# Patient Record
Sex: MALE | Race: WHITE
[De-identification: names, ages, dates, MRNs, and addresses within clinical notes are randomized per-mention and may not be internally consistent; named-entity substitution may affect disease eponyms.]

---

## 2021-01-06 ENCOUNTER — HOSPITAL ENCOUNTER (OUTPATIENT)
Age: 76
End: 2021-01-06
Payer: MEDICARE

## 2021-01-06 DIAGNOSIS — J44.9: Primary | ICD-10-CM

## 2021-01-06 DIAGNOSIS — R05: ICD-10-CM

## 2021-01-06 PROCEDURE — 87077 CULTURE AEROBIC IDENTIFY: CPT

## 2021-01-06 PROCEDURE — 87205 SMEAR GRAM STAIN: CPT

## 2021-01-06 PROCEDURE — 87070 CULTURE OTHR SPECIMN AEROBIC: CPT

## 2021-01-06 PROCEDURE — 87186 SC STD MICRODIL/AGAR DIL: CPT

## 2021-07-15 ENCOUNTER — HOSPITAL ENCOUNTER (OUTPATIENT)
Age: 76
End: 2021-07-15
Payer: MEDICARE

## 2021-07-15 DIAGNOSIS — Z87.891: Primary | ICD-10-CM

## 2021-07-15 PROCEDURE — 71271 CT THORAX LUNG CANCER SCR C-: CPT

## 2021-07-30 ENCOUNTER — HOSPITAL ENCOUNTER (OUTPATIENT)
Dept: HOSPITAL 100 - LABSPEC | Age: 76
Discharge: HOME | End: 2021-07-30
Payer: MEDICARE

## 2021-07-30 DIAGNOSIS — R05: ICD-10-CM

## 2021-07-30 DIAGNOSIS — J47.9: Primary | ICD-10-CM

## 2021-07-30 PROCEDURE — 87070 CULTURE OTHR SPECIMN AEROBIC: CPT

## 2021-07-30 PROCEDURE — 87077 CULTURE AEROBIC IDENTIFY: CPT

## 2021-07-30 PROCEDURE — 87205 SMEAR GRAM STAIN: CPT

## 2022-07-16 ENCOUNTER — HOSPITAL ENCOUNTER (OUTPATIENT)
Dept: HOSPITAL 100 - CT | Age: 77
Discharge: HOME | End: 2022-07-16
Payer: MEDICARE

## 2022-07-16 DIAGNOSIS — Z87.891: Primary | ICD-10-CM

## 2022-07-16 PROCEDURE — 71271 CT THORAX LUNG CANCER SCR C-: CPT

## 2023-04-02 ENCOUNTER — HOSPITAL ENCOUNTER (OUTPATIENT)
Dept: HOSPITAL 100 - LABSPEC | Age: 78
Discharge: HOME | End: 2023-04-02
Payer: MEDICARE

## 2023-04-02 DIAGNOSIS — R05.9: Primary | ICD-10-CM

## 2023-04-02 LAB
CYTO FLD: (no result)
CYTOLOGY, BODY FLUID / CSF: (no result)

## 2023-04-02 PROCEDURE — 88312 SPECIAL STAINS GROUP 1: CPT

## 2023-04-02 PROCEDURE — 88108 CYTOPATH CONCENTRATE TECH: CPT

## 2023-04-02 PROCEDURE — 88313 SPECIAL STAINS GROUP 2: CPT

## 2023-04-13 PROBLEM — M41.9 KYPHOSCOLIOSIS: Status: ACTIVE | Noted: 2023-04-13

## 2023-04-13 PROBLEM — R35.0 URINARY FREQUENCY: Status: ACTIVE | Noted: 2023-04-13

## 2023-04-13 PROBLEM — M48.062 NEUROGENIC CLAUDICATION DUE TO LUMBAR SPINAL STENOSIS: Status: ACTIVE | Noted: 2023-04-13

## 2023-04-13 PROBLEM — R04.2 HEMOPTYSIS: Status: ACTIVE | Noted: 2023-04-13

## 2023-04-13 PROBLEM — M89.9 LESION OF BONE OF KNEE: Status: ACTIVE | Noted: 2023-04-13

## 2023-04-13 PROBLEM — N39.0 ACUTE LOWER UTI: Status: RESOLVED | Noted: 2023-04-13 | Resolved: 2023-04-13

## 2023-04-13 PROBLEM — M47.817 LUMBOSACRAL SPONDYLOSIS: Status: ACTIVE | Noted: 2023-04-13

## 2023-04-13 PROBLEM — R35.1 BENIGN PROSTATIC HYPERPLASIA WITH NOCTURIA: Status: ACTIVE | Noted: 2023-04-13

## 2023-04-13 PROBLEM — R91.1 PULMONARY NODULE: Status: ACTIVE | Noted: 2023-04-13

## 2023-04-13 PROBLEM — R35.1 NOCTURIA: Status: ACTIVE | Noted: 2023-04-13

## 2023-04-13 PROBLEM — M19.042 ARTHRITIS OF LEFT HAND: Status: ACTIVE | Noted: 2023-04-13

## 2023-04-13 PROBLEM — M54.17 LUMBOSACRAL RADICULOPATHY: Status: ACTIVE | Noted: 2023-04-13

## 2023-04-13 PROBLEM — R53.83 FATIGUE: Status: ACTIVE | Noted: 2023-04-13

## 2023-04-13 PROBLEM — R63.4 WEIGHT LOSS: Status: ACTIVE | Noted: 2023-04-13

## 2023-04-13 PROBLEM — G89.4 CHRONIC PAIN DISORDER: Status: ACTIVE | Noted: 2023-04-13

## 2023-04-13 PROBLEM — J40 BRONCHITIS: Status: ACTIVE | Noted: 2023-04-13

## 2023-04-13 PROBLEM — M54.12 CERVICAL RADICULITIS: Status: ACTIVE | Noted: 2023-04-13

## 2023-04-13 PROBLEM — M62.81 MUSCLE WEAKNESS OF LEFT UPPER EXTREMITY: Status: ACTIVE | Noted: 2023-04-13

## 2023-04-13 PROBLEM — G25.2 INTENTION TREMOR: Status: ACTIVE | Noted: 2023-04-13

## 2023-04-13 PROBLEM — R29.898 LEFT ARM WEAKNESS: Status: ACTIVE | Noted: 2023-04-13

## 2023-04-13 PROBLEM — N40.1 BENIGN PROSTATIC HYPERPLASIA WITH NOCTURIA: Status: ACTIVE | Noted: 2023-04-13

## 2023-04-13 PROBLEM — J44.9 COPD (CHRONIC OBSTRUCTIVE PULMONARY DISEASE) (MULTI): Status: ACTIVE | Noted: 2023-04-13

## 2023-04-13 PROBLEM — M48.02 CERVICAL SPINAL STENOSIS: Status: ACTIVE | Noted: 2023-04-13

## 2023-04-13 RX ORDER — LATANOPROST 50 UG/ML
1 SOLUTION/ DROPS OPHTHALMIC DAILY
COMMUNITY
Start: 2021-05-28

## 2023-04-13 RX ORDER — TRAMADOL HYDROCHLORIDE 50 MG/1
50 TABLET ORAL 3 TIMES DAILY PRN
COMMUNITY
Start: 2022-11-30 | End: 2023-10-24 | Stop reason: SDUPTHER

## 2023-04-13 RX ORDER — GLUC/MSM/COLGN2/HYAL/ANTIARTH3 375-375-20
1 TABLET ORAL DAILY
COMMUNITY

## 2023-04-13 RX ORDER — FINASTERIDE 5 MG/1
1 TABLET, FILM COATED ORAL DAILY
COMMUNITY

## 2023-04-13 RX ORDER — TAMSULOSIN HYDROCHLORIDE 0.4 MG/1
0.4 CAPSULE ORAL
COMMUNITY
End: 2024-02-12 | Stop reason: SDUPTHER

## 2023-04-13 RX ORDER — PROPRANOLOL HYDROCHLORIDE 20 MG/1
1 TABLET ORAL 2 TIMES DAILY
COMMUNITY

## 2023-04-13 RX ORDER — NITROFURANTOIN 25; 75 MG/1; MG/1
1 CAPSULE ORAL 2 TIMES DAILY
COMMUNITY
Start: 2021-05-11

## 2023-04-13 RX ORDER — ATORVASTATIN CALCIUM 40 MG/1
40 TABLET, FILM COATED ORAL DAILY
COMMUNITY

## 2023-04-14 ENCOUNTER — OFFICE VISIT (OUTPATIENT)
Dept: PRIMARY CARE | Facility: CLINIC | Age: 78
End: 2023-04-14
Payer: MEDICARE

## 2023-04-14 VITALS
HEART RATE: 110 BPM | DIASTOLIC BLOOD PRESSURE: 64 MMHG | BODY MASS INDEX: 19.34 KG/M2 | OXYGEN SATURATION: 92 % | WEIGHT: 116.1 LBS | SYSTOLIC BLOOD PRESSURE: 118 MMHG | HEIGHT: 65 IN

## 2023-04-14 DIAGNOSIS — R91.1 PULMONARY NODULE: ICD-10-CM

## 2023-04-14 DIAGNOSIS — R04.2 HEMOPTYSIS: Primary | ICD-10-CM

## 2023-04-14 DIAGNOSIS — J41.8 MIXED SIMPLE AND MUCOPURULENT CHRONIC BRONCHITIS (MULTI): ICD-10-CM

## 2023-04-14 PROCEDURE — 1157F ADVNC CARE PLAN IN RCRD: CPT | Performed by: FAMILY MEDICINE

## 2023-04-14 PROCEDURE — 99214 OFFICE O/P EST MOD 30 MIN: CPT | Performed by: FAMILY MEDICINE

## 2023-04-14 PROCEDURE — 1160F RVW MEDS BY RX/DR IN RCRD: CPT | Performed by: FAMILY MEDICINE

## 2023-04-14 PROCEDURE — 1036F TOBACCO NON-USER: CPT | Performed by: FAMILY MEDICINE

## 2023-04-14 PROCEDURE — 1159F MED LIST DOCD IN RCRD: CPT | Performed by: FAMILY MEDICINE

## 2023-04-14 ASSESSMENT — ENCOUNTER SYMPTOMS
FEVER: 0
COUGH: 1
CHILLS: 0
PALPITATIONS: 0
NAUSEA: 0
WHEEZING: 1
FATIGUE: 1
SHORTNESS OF BREATH: 1

## 2023-04-14 ASSESSMENT — PATIENT HEALTH QUESTIONNAIRE - PHQ9
1. LITTLE INTEREST OR PLEASURE IN DOING THINGS: NOT AT ALL
2. FEELING DOWN, DEPRESSED OR HOPELESS: NOT AT ALL
SUM OF ALL RESPONSES TO PHQ9 QUESTIONS 1 AND 2: 0

## 2023-04-14 NOTE — PROGRESS NOTES
"Subjective   Patient ID: Olayinka Melara is a 77 y.o. male who presents for Follow-up (ER ).    HPI   He was in his usual state of health without any additional shortness of breath URI symptoms or allergy symptoms, but he was coughing and coughed up some red blood clots.  Went to the ER and the basic blood testing was stable, CT showed bronchitis but also a new nodule that was 1 cm x 0.5 cm.  This was new compared to the CT from 2021.  He did not feel bad before but he does feel little bit better with the antibiotic and the prednisone.  He has an office visit with pulmonology next week.  And I talked to him about the importance of keeping that up.  The likely diagnosis is bronchitis caused the clot, but obviously we have to follow the new lung nodule.  He will call sooner if he has more breathing issues or coughs up any more blood.  If he coughs up a lot of blood and feels sicker he will go back to the ER    Review of Systems   Constitutional:  Positive for fatigue. Negative for chills and fever.   Respiratory:  Positive for cough, shortness of breath and wheezing.    Cardiovascular:  Negative for chest pain and palpitations.   Gastrointestinal:  Negative for nausea.       Objective   /64   Pulse 110   Ht 1.651 m (5' 5\")   Wt 52.7 kg (116 lb 1.6 oz)   SpO2 92%   BMI 19.32 kg/m²     Physical Exam  Constitutional:       Appearance: Normal appearance.   HENT:      Head: Normocephalic and atraumatic.   Cardiovascular:      Rate and Rhythm: Normal rate and regular rhythm.      Heart sounds: Normal heart sounds.   Pulmonary:      Effort: Pulmonary effort is normal. No respiratory distress.      Breath sounds: No stridor. Wheezing and rhonchi present. No rales.   Chest:      Chest wall: No tenderness.   Skin:     General: Skin is warm and dry.   Neurological:      General: No focal deficit present.      Mental Status: He is alert and oriented to person, place, and time.      Gait: Gait normal.   Psychiatric:    "      Mood and Affect: Mood normal.         Behavior: Behavior normal.         Thought Content: Thought content normal.         Judgment: Judgment normal.         Assessment/Plan   Problem List Items Addressed This Visit          Respiratory    COPD (chronic obstructive pulmonary disease) (CMS/HCC)    Hemoptysis - Primary    Pulmonary nodule

## 2023-08-25 ENCOUNTER — HOSPITAL ENCOUNTER (OUTPATIENT)
Dept: DATA CONVERSION | Facility: HOSPITAL | Age: 78
End: 2023-08-25
Attending: PAIN MEDICINE | Admitting: PAIN MEDICINE
Payer: MEDICARE

## 2023-08-25 DIAGNOSIS — M48.07 SPINAL STENOSIS, LUMBOSACRAL REGION: ICD-10-CM

## 2023-08-25 DIAGNOSIS — R52 PAIN, UNSPECIFIED: ICD-10-CM

## 2023-08-25 DIAGNOSIS — M54.17 RADICULOPATHY, LUMBOSACRAL REGION: ICD-10-CM

## 2023-09-01 ENCOUNTER — HOSPITAL ENCOUNTER (OUTPATIENT)
Age: 78
Discharge: HOME | End: 2023-09-01
Payer: MEDICARE

## 2023-09-01 DIAGNOSIS — D50.9: ICD-10-CM

## 2023-09-01 DIAGNOSIS — R06.02: ICD-10-CM

## 2023-09-01 DIAGNOSIS — R05.9: Primary | ICD-10-CM

## 2023-09-01 LAB
DEPRECATED RDW RBC: 55.6 FL (ref 35.1–43.9)
ERYTHROCYTE [DISTWIDTH] IN BLOOD: 17.9 % (ref 11.6–14.6)
HCT VFR BLD AUTO: 33.7 % (ref 40–54)
HEMOGLOBIN: 10.5 G/DL (ref 13–16.5)
HGB BLD-MCNC: 10.5 G/DL (ref 13–16.5)
MCV RBC: 86.4 FL (ref 80–94)
MEAN CORP HGB CONC: 31.2 G/DL (ref 32–36)
MEAN PLATELET VOL.: 9.9 FL (ref 6.2–12)
PLATELET # BLD: 474 K/MM3 (ref 150–450)
PLATELET COUNT: 474 K/MM3 (ref 150–450)
RBC # BLD AUTO: 3.9 M/MM3 (ref 4.6–6.2)
RBC DISTRIBUTION WIDTH CV: 17.9 % (ref 11.6–14.6)
RBC DISTRIBUTION WIDTH SD: 55.6 FL (ref 35.1–43.9)
WBC # BLD AUTO: 7.2 K/MM3 (ref 4.4–11)
WHITE BLOOD COUNT: 7.2 K/MM3 (ref 4.4–11)

## 2023-09-01 PROCEDURE — 87186 SC STD MICRODIL/AGAR DIL: CPT

## 2023-09-01 PROCEDURE — 87070 CULTURE OTHR SPECIMN AEROBIC: CPT

## 2023-09-01 PROCEDURE — 87077 CULTURE AEROBIC IDENTIFY: CPT

## 2023-09-01 PROCEDURE — 87205 SMEAR GRAM STAIN: CPT

## 2023-09-01 PROCEDURE — 36415 COLL VENOUS BLD VENIPUNCTURE: CPT

## 2023-09-01 PROCEDURE — 85027 COMPLETE CBC AUTOMATED: CPT

## 2023-10-01 NOTE — OP NOTE
PROCEDURE DETAILS    Preoperative Diagnosis:  Lumbosacral radiculopathy, M54.17    Postoperative Diagnosis:  Lumbosacral radiculopathy, M54.17    Surgeon: Buck Baer  Resident/Fellow/Other Assistant: None of these were associated with this case    Procedure:  1. R L4 + L5 TFESI    Anesthesia: No anesthesiologist associated with this case  Estimated Blood Loss: 0  Findings: NA  Additional Details: The patient has a greater than 2-month history of severe low back and leg pain.  The patient has previously had 6 weeks of conservative management with exercise therapy  and medications.  The patient is compliant with home exercises for this issue.  The pain significantly interrupts the patient's physical function.  The patient does not desire spine surgery and his medical comorbidities make him an unacceptable candidate  for surgery.  The patient had undergone a previous epidural injection in December 2022 and obtained greater 60% pain relief and functional improvement for over 3 months.  He cannot sleep for 4 hours or lift or carry anything at all due to the pain.  His  imaging is notable for right-sided neuroforaminal stenosis at L4-L5 and L5-S1 which correlates with his symptoms of a right L4 and L5 radiculopathy so the procedure was performed at those 2 levels.        Operative Report:   Procedure: Right lumbar transforaminal epidural steroid injection under fluoroscopic guidance of the right L4 nerve root at the right L4-L5 foramen and the  right L5 nerve root at the right L5-S1 foramen  Diagnosis: Lumbosacral radiculopathy  Solution used: 1 ml of Kenalog 40mg, 3 ml normal saline, 1 ml lidocaine 2%, 5ml total. 2.5 mL per site  Anesthesia: Local  Complications: None    After informed consent was obtained, the patient was brought to the OR and placed in the prone position. The area in question was prepped and draped  in sterile fashion. An ipsilateral oblique fluoroscopic view of the lumbar spine was  obtained and after 3ml of lidocaine 1% was injected into the skin at each site, a 22-gauge Chiba needle was inserted into the skin and advanced to the 6 clock position  beneath the right L4 and L5 pedicles under intermittent fluoroscopic guidance.  Needle placement was challenging due to the patient's anatomy.  Proper needle position was confirmed via both AP and lateral fluoroscopy.  1 mL Omnipaque was injected under  live fluoroscopy at each site which demonstrated appropriate epidural and nerve root uptake and the absence of any intravascular or intrathecal spread. The local anesthetic steroid solution was then injected incrementally at each site. The 2 needles were  removed. Bleeding was nil. The patient tolerated the procedure well and was transferred to the recovery room in good condition.                        Attestation:   Note Completion:  Attending Attestation I performed the procedure without a resident         Electronic Signatures:  Buck Baer)  (Signed 25-Aug-2023 17:08)   Authored: Post-Operative Note, Chart Review, Note Completion      Last Updated: 25-Aug-2023 17:08 by Buck Baer)

## 2023-10-02 LAB
6-ACETYLMORPHINE: <25 NG/ML
7-AMINOCLONAZEPAM: <25 NG/ML
ALPHA-HYDROXYALPRAZOLAM: <25 NG/ML
ALPHA-HYDROXYMIDAZOLAM: <25 NG/ML
ALPRAZOLAM: <25 NG/ML
AMPHETAMINE (PRESENCE) IN URINE BY SCREEN METHOD: ABNORMAL
BARBITURATES PRESENCE IN URINE BY SCREEN METHOD: ABNORMAL
CANNABINOIDS IN URINE BY SCREEN METHOD: ABNORMAL
CHLORDIAZEPOXIDE: <25 NG/ML
CLONAZEPAM: <25 NG/ML
COCAINE (PRESENCE) IN URINE BY SCREEN METHOD: ABNORMAL
CODEINE: <50 NG/ML
CREATINE, URINE FOR DRUG: 71.5 MG/DL
DIAZEPAM: <25 NG/ML
DRUG SCREEN COMMENT URINE: ABNORMAL
EDDP: <25 NG/ML
FENTANYL CONFIRMATION, URINE: <2.5 NG/ML
HYDROCODONE: <25 NG/ML
HYDROMORPHONE: <25 NG/ML
LORAZEPAM: <25 NG/ML
METHADONE CONFIRMATION,URINE: <25 NG/ML
MIDAZOLAM: <25 NG/ML
MORPHINE URINE: <50 NG/ML
NORDIAZEPAM: <25 NG/ML
NORFENTANYL: <2.5 NG/ML
NORHYDROCODONE: <25 NG/ML
NOROXYCODONE: <25 NG/ML
O-DESMETHYLTRAMADOL: >1000 NG/ML
OXAZEPAM: <25 NG/ML
OXYCODONE: <25 NG/ML
OXYMORPHONE: <25 NG/ML
PHENCYCLIDINE (PRESENCE) IN URINE BY SCREEN METHOD: ABNORMAL
TEMAZEPAM: <25 NG/ML
TRAMADOL: >1000 NG/ML
ZOLPIDEM METABOLITE (ZCA): <25 NG/ML
ZOLPIDEM: <25 NG/ML

## 2023-10-06 ENCOUNTER — LAB (OUTPATIENT)
Dept: LAB | Facility: LAB | Age: 78
End: 2023-10-06
Payer: MEDICARE

## 2023-10-06 DIAGNOSIS — D50.9 IRON DEFICIENCY ANEMIA, UNSPECIFIED: ICD-10-CM

## 2023-10-06 DIAGNOSIS — D50.9 IRON DEFICIENCY ANEMIA, UNSPECIFIED: Primary | ICD-10-CM

## 2023-10-06 LAB
ALBUMIN SERPL BCP-MCNC: 4 G/DL (ref 3.4–5)
ALP SERPL-CCNC: 65 U/L (ref 33–136)
ALT SERPL W P-5'-P-CCNC: 5 U/L (ref 10–52)
ANION GAP SERPL CALC-SCNC: 12 MMOL/L (ref 10–20)
AST SERPL W P-5'-P-CCNC: 9 U/L (ref 9–39)
BASOPHILS # BLD AUTO: 0.05 X10*3/UL (ref 0–0.1)
BASOPHILS NFR BLD AUTO: 0.8 %
BILIRUB SERPL-MCNC: 0.9 MG/DL (ref 0–1.2)
BUN SERPL-MCNC: 14 MG/DL (ref 6–23)
CALCIUM SERPL-MCNC: 9 MG/DL (ref 8.6–10.3)
CHLORIDE SERPL-SCNC: 99 MMOL/L (ref 98–107)
CO2 SERPL-SCNC: 30 MMOL/L (ref 21–32)
CREAT SERPL-MCNC: 0.62 MG/DL (ref 0.5–1.3)
EOSINOPHIL # BLD AUTO: 0.09 X10*3/UL (ref 0–0.4)
EOSINOPHIL NFR BLD AUTO: 1.4 %
ERYTHROCYTE [DISTWIDTH] IN BLOOD BY AUTOMATED COUNT: 19.7 % (ref 11.5–14.5)
FERRITIN SERPL-MCNC: 29 NG/ML (ref 20–300)
FOLATE SERPL-MCNC: 7.8 NG/ML
GFR SERPL CREATININE-BSD FRML MDRD: >90 ML/MIN/1.73M*2
GLUCOSE SERPL-MCNC: 83 MG/DL (ref 74–99)
HCT VFR BLD AUTO: 38.6 % (ref 41–52)
HGB BLD-MCNC: 11 G/DL (ref 13.5–17.5)
IMM GRANULOCYTES # BLD AUTO: 0.09 X10*3/UL (ref 0–0.5)
IMM GRANULOCYTES NFR BLD AUTO: 1.4 % (ref 0–0.9)
IRON SATN MFR SERPL: 8 % (ref 25–45)
IRON SERPL-MCNC: 29 UG/DL (ref 35–150)
LYMPHOCYTES # BLD AUTO: 1.65 X10*3/UL (ref 0.8–3)
LYMPHOCYTES NFR BLD AUTO: 25.6 %
MCH RBC QN AUTO: 25.2 PG (ref 26–34)
MCHC RBC AUTO-ENTMCNC: 28.5 G/DL (ref 32–36)
MCV RBC AUTO: 89 FL (ref 80–100)
MONOCYTES # BLD AUTO: 0.51 X10*3/UL (ref 0.05–0.8)
MONOCYTES NFR BLD AUTO: 7.9 %
NEUTROPHILS # BLD AUTO: 4.06 X10*3/UL (ref 1.6–5.5)
NEUTROPHILS NFR BLD AUTO: 62.9 %
NRBC BLD-RTO: 0 /100 WBCS (ref 0–0)
PLATELET # BLD AUTO: 411 X10*3/UL (ref 150–450)
PMV BLD AUTO: 9.9 FL (ref 7.5–11.5)
POTASSIUM SERPL-SCNC: 4.5 MMOL/L (ref 3.5–5.3)
PROT SERPL-MCNC: 6.1 G/DL (ref 6.4–8.2)
RBC # BLD AUTO: 4.36 X10*6/UL (ref 4.5–5.9)
SODIUM SERPL-SCNC: 136 MMOL/L (ref 136–145)
TIBC SERPL-MCNC: 357 UG/DL (ref 240–445)
UIBC SERPL-MCNC: 328 UG/DL (ref 110–370)
VIT B12 SERPL-MCNC: 721 PG/ML (ref 211–911)
WBC # BLD AUTO: 6.5 X10*3/UL (ref 4.4–11.3)

## 2023-10-06 PROCEDURE — 83550 IRON BINDING TEST: CPT

## 2023-10-06 PROCEDURE — 82607 VITAMIN B-12: CPT

## 2023-10-06 PROCEDURE — 82746 ASSAY OF FOLIC ACID SERUM: CPT

## 2023-10-06 PROCEDURE — 85025 COMPLETE CBC W/AUTO DIFF WBC: CPT

## 2023-10-06 PROCEDURE — 82728 ASSAY OF FERRITIN: CPT

## 2023-10-06 PROCEDURE — 36415 COLL VENOUS BLD VENIPUNCTURE: CPT

## 2023-10-06 PROCEDURE — 80053 COMPREHEN METABOLIC PANEL: CPT

## 2023-10-06 PROCEDURE — 83540 ASSAY OF IRON: CPT

## 2023-10-11 ENCOUNTER — HOSPITAL ENCOUNTER (OUTPATIENT)
Dept: HOSPITAL 100 - CT | Age: 78
Discharge: HOME | End: 2023-10-11
Payer: MEDICARE

## 2023-10-11 DIAGNOSIS — R91.1: Primary | ICD-10-CM

## 2023-10-11 PROCEDURE — 71250 CT THORAX DX C-: CPT

## 2023-10-12 ENCOUNTER — OFFICE VISIT (OUTPATIENT)
Dept: HEMATOLOGY/ONCOLOGY | Facility: CLINIC | Age: 78
End: 2023-10-12
Payer: MEDICARE

## 2023-10-12 VITALS
TEMPERATURE: 97.9 F | SYSTOLIC BLOOD PRESSURE: 94 MMHG | BODY MASS INDEX: 18.67 KG/M2 | OXYGEN SATURATION: 97 % | HEART RATE: 81 BPM | WEIGHT: 116.18 LBS | RESPIRATION RATE: 18 BRPM | HEIGHT: 66 IN | DIASTOLIC BLOOD PRESSURE: 64 MMHG

## 2023-10-12 DIAGNOSIS — E61.1 IRON DEFICIENCY: ICD-10-CM

## 2023-10-12 DIAGNOSIS — D63.8 ANEMIA OF CHRONIC DISEASE: Primary | ICD-10-CM

## 2023-10-12 PROCEDURE — 1159F MED LIST DOCD IN RCRD: CPT

## 2023-10-12 PROCEDURE — 1036F TOBACCO NON-USER: CPT

## 2023-10-12 PROCEDURE — 99214 OFFICE O/P EST MOD 30 MIN: CPT

## 2023-10-12 PROCEDURE — 1126F AMNT PAIN NOTED NONE PRSNT: CPT

## 2023-10-12 PROCEDURE — 1160F RVW MEDS BY RX/DR IN RCRD: CPT

## 2023-10-12 RX ORDER — METHYLPREDNISOLONE SODIUM SUCCINATE 40 MG/ML
40 INJECTION INTRAMUSCULAR; INTRAVENOUS AS NEEDED
Status: CANCELLED | OUTPATIENT
Start: 2023-10-16

## 2023-10-12 RX ORDER — ALBUTEROL SULFATE 0.83 MG/ML
3 SOLUTION RESPIRATORY (INHALATION) AS NEEDED
Status: CANCELLED | OUTPATIENT
Start: 2023-10-16

## 2023-10-12 RX ORDER — HEPARIN 100 UNIT/ML
500 SYRINGE INTRAVENOUS AS NEEDED
Status: CANCELLED | OUTPATIENT
Start: 2023-10-16

## 2023-10-12 RX ORDER — FAMOTIDINE 10 MG/ML
20 INJECTION INTRAVENOUS ONCE AS NEEDED
Status: CANCELLED | OUTPATIENT
Start: 2023-10-16

## 2023-10-12 RX ORDER — EPINEPHRINE 0.3 MG/.3ML
0.3 INJECTION SUBCUTANEOUS EVERY 5 MIN PRN
Status: CANCELLED | OUTPATIENT
Start: 2023-10-16

## 2023-10-12 RX ORDER — DIPHENHYDRAMINE HYDROCHLORIDE 50 MG/ML
50 INJECTION INTRAMUSCULAR; INTRAVENOUS AS NEEDED
Status: CANCELLED | OUTPATIENT
Start: 2023-10-16

## 2023-10-12 RX ORDER — HEPARIN SODIUM,PORCINE/PF 10 UNIT/ML
50 SYRINGE (ML) INTRAVENOUS AS NEEDED
Status: CANCELLED | OUTPATIENT
Start: 2023-10-16

## 2023-10-12 ASSESSMENT — PAIN SCALES - GENERAL: PAINLEVEL: 0-NO PAIN

## 2023-10-12 NOTE — PROGRESS NOTES
Patient ID: Mony Melara is a 77 y.o. male.    Subjective    HPI    History of Present Illness:     ID Statement:    MONY MELARA is a 77 year old Male      Chief Complaint: Evaluation for anemia   Interval History:    PCP : Dr. Aaron    Reason for referral: Anemia    HPI  75 year old man with pmh of BPH, COPD, kyphoscoliosis, Lumbosacral spondylosis, referred for anemia. He has chronic anemia and progressively dropping Hg 11-12 g/dl last year to 8.6 g/dl in Sep 2021 until recently he had a visit to ER on 10/22/21 for SoB where his Hg was 7.0 g/dl. MCV is 64, his ferritin was 10 , b12 and ferritin were normal last year. He was transfused 1 unit PRBC and told to follow hematology  He has been feeling more tired recently , he has chronic fatigue that worsened near the time he went to the emergency room   He has chronic SOB and DUARTE from COPD and actually was having some hemoptysis with blood tinged sputum that day . This has improved   He has occasionally seeing melena around once per month   He has chronic  epigastric pain and occasional nausea   He is not active and does not have energy to do much activities   Weight fluctuating but mostly same range of 112-118   He is no urinary complaints   No changes in hair or nails     EGD on 1/21/22 showed erosive gastropathy with stigmata of bleeding, biopsies showed healing ulcers , no malignancy     interval history - 10/12/23    Energy is decent  Eating and drinking okay   Bruises easy  No bleeding issues    DUARTE stairs remains on 2L O2  Occasional coughing- yellow/clear  No chest pain or pressure   No fever, chills, or night sweats    No abdominal pain or cramping   No diarrhea or constipation   Frequent urination, no dysuria or hematuria    No numbness or tingling in hands or feet  No new concerns      Objective    BSA: 1.56 meters squared  BP 94/64 (BP Location: Right arm, Patient Position: Sitting, BP Cuff Size: Adult)   Pulse 81   Temp 36.6 °C (97.9 °F)  "(Temporal)   Resp 18   Ht 1.664 m (5' 5.5\")   Wt 52.7 kg (116 lb 2.9 oz)   SpO2 97%   BMI 19.04 kg/m²      Physical Exam  Vitals and nursing note reviewed.   Constitutional:       Appearance: Normal appearance.   HENT:      Head: Normocephalic and atraumatic.      Mouth/Throat:      Mouth: Mucous membranes are moist.      Pharynx: Oropharynx is clear.   Eyes:      Extraocular Movements: Extraocular movements intact.      Pupils: Pupils are equal, round, and reactive to light.   Cardiovascular:      Pulses: Normal pulses.      Heart sounds: Normal heart sounds. No murmur heard.     No gallop.   Pulmonary:      Effort: No respiratory distress.      Breath sounds: Wheezing present.      Comments: Home oxygen 2L NC    Abdominal:      General: Bowel sounds are normal. There is no distension.      Palpations: There is no mass.      Tenderness: There is no abdominal tenderness.   Musculoskeletal:         General: No swelling or deformity. Normal range of motion.      Cervical back: Normal range of motion and neck supple.   Skin:     General: Skin is warm and dry.      Findings: No erythema or lesion.   Neurological:      General: No focal deficit present.      Mental Status: He is alert and oriented to person, place, and time.      Motor: No weakness.   Psychiatric:         Mood and Affect: Mood normal.         Behavior: Behavior normal.         Performance Status:  Asymptomatic      Assessment/Plan      Assessment:    1. Anemia   secondary to iron deficiency on a likely AOCD  s/p one unit PRBC transfusion in ER in Oct 2021     have received 2 course of IV iron   improved Hg now to 11.4 g/dl and sufficient iron stores     remaining labs normal/ negative     EGD showed evidence of upper GI bleed with erosive gastropathy/ ulcer     11/29/22- His energy remains low and he feels weak. His breathing is stable, remains on 2L oxygen. Denies any abnormal bleeding or bruising issues. Is taking PO iron daily, and tolerating " "well.     He is slightly more anemic and iron deficient. Hgb (10.7), Iron Serum (70), Ferritin (34), TIBC (319), Tsat (22%), Vitamin B12 (527). Discussed with patient and his wife doing two doses of Venofer and will repeat labs in 8 weeks after second dose of Venofer.    10/12/23:  Energy continues to be low. Breathing is stable, home oxygen 2L nasal canula. No major bleeding events, patient does bruise easy.  No constitutional \"B\" symptoms reported.      He remains mildly anemic with improvement Hgb of 11.0g/dl.  Iron parameters are once again deficient. Fe 29, T sat 8%, Ferritin 29, B12 721.  Discussed ordering IV iron for iron replacement. Will continue to monitor labs closely.          Plan:  Ordered IV course of Iron. Sent to RN for precert.    2. Labs prior to follow-up (CBC w/diff, CMP, Ferritin, Iron studies, B12)  3. Follow-up in 6-8 weeks following completion of infusion                  LUCIANA Zamora-CORTNEY           "

## 2023-10-23 ENCOUNTER — TELEPHONE (OUTPATIENT)
Dept: PAIN MEDICINE | Facility: CLINIC | Age: 78
End: 2023-10-23
Payer: MEDICARE

## 2023-10-23 DIAGNOSIS — M48.062 NEUROGENIC CLAUDICATION DUE TO LUMBAR SPINAL STENOSIS: Primary | ICD-10-CM

## 2023-10-23 DIAGNOSIS — M54.12 CERVICAL RADICULITIS: ICD-10-CM

## 2023-10-23 DIAGNOSIS — G89.4 CHRONIC PAIN DISORDER: ICD-10-CM

## 2023-10-23 NOTE — TELEPHONE ENCOUNTER
PATIENT CALLING FOR REFILL    TRAMADOL 50MG 2 PO every day PRN    SEND TO [facility]    NEXT OV 11/28/23

## 2023-10-24 ENCOUNTER — INFUSION (OUTPATIENT)
Dept: HEMATOLOGY/ONCOLOGY | Facility: CLINIC | Age: 78
End: 2023-10-24
Payer: MEDICARE

## 2023-10-24 VITALS
HEART RATE: 73 BPM | DIASTOLIC BLOOD PRESSURE: 83 MMHG | TEMPERATURE: 97.9 F | WEIGHT: 121.47 LBS | OXYGEN SATURATION: 99 % | SYSTOLIC BLOOD PRESSURE: 144 MMHG | RESPIRATION RATE: 20 BRPM | BODY MASS INDEX: 19.91 KG/M2

## 2023-10-24 DIAGNOSIS — E61.1 IRON DEFICIENCY: ICD-10-CM

## 2023-10-24 DIAGNOSIS — D63.8 ANEMIA OF CHRONIC DISEASE: ICD-10-CM

## 2023-10-24 PROCEDURE — 96365 THER/PROPH/DIAG IV INF INIT: CPT

## 2023-10-24 PROCEDURE — 2500000004 HC RX 250 GENERAL PHARMACY W/ HCPCS (ALT 636 FOR OP/ED)

## 2023-10-24 RX ORDER — HEPARIN SODIUM,PORCINE/PF 10 UNIT/ML
50 SYRINGE (ML) INTRAVENOUS AS NEEDED
Status: CANCELLED | OUTPATIENT
Start: 2023-10-24

## 2023-10-24 RX ORDER — ALBUTEROL SULFATE 0.83 MG/ML
3 SOLUTION RESPIRATORY (INHALATION) AS NEEDED
Status: DISCONTINUED | OUTPATIENT
Start: 2023-10-24 | End: 2023-10-24 | Stop reason: HOSPADM

## 2023-10-24 RX ORDER — HEPARIN 100 UNIT/ML
500 SYRINGE INTRAVENOUS AS NEEDED
Status: CANCELLED | OUTPATIENT
Start: 2023-10-24

## 2023-10-24 RX ORDER — EPINEPHRINE 0.3 MG/.3ML
0.3 INJECTION SUBCUTANEOUS EVERY 5 MIN PRN
Status: DISCONTINUED | OUTPATIENT
Start: 2023-10-24 | End: 2023-10-24 | Stop reason: HOSPADM

## 2023-10-24 RX ORDER — FAMOTIDINE 10 MG/ML
20 INJECTION INTRAVENOUS ONCE AS NEEDED
Status: CANCELLED | OUTPATIENT
Start: 2023-10-31

## 2023-10-24 RX ORDER — DIPHENHYDRAMINE HYDROCHLORIDE 50 MG/ML
50 INJECTION INTRAMUSCULAR; INTRAVENOUS AS NEEDED
Status: DISCONTINUED | OUTPATIENT
Start: 2023-10-24 | End: 2023-10-24 | Stop reason: HOSPADM

## 2023-10-24 RX ORDER — FAMOTIDINE 10 MG/ML
20 INJECTION INTRAVENOUS ONCE AS NEEDED
Status: DISCONTINUED | OUTPATIENT
Start: 2023-10-24 | End: 2023-10-24 | Stop reason: HOSPADM

## 2023-10-24 RX ORDER — DIPHENHYDRAMINE HYDROCHLORIDE 50 MG/ML
50 INJECTION INTRAMUSCULAR; INTRAVENOUS AS NEEDED
Status: CANCELLED | OUTPATIENT
Start: 2023-10-31

## 2023-10-24 RX ORDER — TRAMADOL HYDROCHLORIDE 50 MG/1
50 TABLET ORAL 3 TIMES DAILY PRN
Qty: 90 TABLET | Refills: 0 | Status: SHIPPED | OUTPATIENT
Start: 2023-10-24 | End: 2023-11-21 | Stop reason: SDUPTHER

## 2023-10-24 RX ORDER — ALBUTEROL SULFATE 0.83 MG/ML
3 SOLUTION RESPIRATORY (INHALATION) AS NEEDED
Status: CANCELLED | OUTPATIENT
Start: 2023-10-31

## 2023-10-24 RX ORDER — EPINEPHRINE 0.3 MG/.3ML
0.3 INJECTION SUBCUTANEOUS EVERY 5 MIN PRN
Status: CANCELLED | OUTPATIENT
Start: 2023-10-31

## 2023-10-24 RX ADMIN — FERUMOXYTOL 510 MG: 510 INJECTION INTRAVENOUS at 09:42

## 2023-10-24 ASSESSMENT — PAIN SCALES - GENERAL: PAINLEVEL: 0-NO PAIN

## 2023-10-31 ENCOUNTER — INFUSION (OUTPATIENT)
Dept: HEMATOLOGY/ONCOLOGY | Facility: CLINIC | Age: 78
End: 2023-10-31
Payer: MEDICARE

## 2023-10-31 VITALS
OXYGEN SATURATION: 97 % | TEMPERATURE: 97.9 F | RESPIRATION RATE: 18 BRPM | SYSTOLIC BLOOD PRESSURE: 145 MMHG | HEART RATE: 64 BPM | DIASTOLIC BLOOD PRESSURE: 82 MMHG

## 2023-10-31 DIAGNOSIS — E61.1 IRON DEFICIENCY: ICD-10-CM

## 2023-10-31 DIAGNOSIS — D63.8 ANEMIA OF CHRONIC DISEASE: ICD-10-CM

## 2023-10-31 PROCEDURE — 2500000004 HC RX 250 GENERAL PHARMACY W/ HCPCS (ALT 636 FOR OP/ED): Mod: JZ

## 2023-10-31 PROCEDURE — 96365 THER/PROPH/DIAG IV INF INIT: CPT

## 2023-10-31 RX ORDER — DIPHENHYDRAMINE HYDROCHLORIDE 50 MG/ML
50 INJECTION INTRAMUSCULAR; INTRAVENOUS AS NEEDED
Status: CANCELLED | OUTPATIENT
Start: 2023-11-07

## 2023-10-31 RX ORDER — EPINEPHRINE 0.3 MG/.3ML
0.3 INJECTION SUBCUTANEOUS EVERY 5 MIN PRN
Status: DISCONTINUED | OUTPATIENT
Start: 2023-10-31 | End: 2023-10-31

## 2023-10-31 RX ORDER — ALBUTEROL SULFATE 0.83 MG/ML
3 SOLUTION RESPIRATORY (INHALATION) AS NEEDED
Status: DISCONTINUED | OUTPATIENT
Start: 2023-10-31 | End: 2023-10-31

## 2023-10-31 RX ORDER — ALBUTEROL SULFATE 0.83 MG/ML
3 SOLUTION RESPIRATORY (INHALATION) AS NEEDED
Status: CANCELLED | OUTPATIENT
Start: 2023-11-07

## 2023-10-31 RX ORDER — EPINEPHRINE 0.3 MG/.3ML
0.3 INJECTION SUBCUTANEOUS EVERY 5 MIN PRN
Status: CANCELLED | OUTPATIENT
Start: 2023-11-07

## 2023-10-31 RX ORDER — FAMOTIDINE 10 MG/ML
20 INJECTION INTRAVENOUS ONCE AS NEEDED
Status: DISCONTINUED | OUTPATIENT
Start: 2023-10-31 | End: 2023-10-31

## 2023-10-31 RX ORDER — HEPARIN 100 UNIT/ML
500 SYRINGE INTRAVENOUS AS NEEDED
Status: CANCELLED | OUTPATIENT
Start: 2023-10-31

## 2023-10-31 RX ORDER — HEPARIN SODIUM,PORCINE/PF 10 UNIT/ML
50 SYRINGE (ML) INTRAVENOUS AS NEEDED
Status: CANCELLED | OUTPATIENT
Start: 2023-10-31

## 2023-10-31 RX ORDER — FAMOTIDINE 10 MG/ML
20 INJECTION INTRAVENOUS ONCE AS NEEDED
Status: CANCELLED | OUTPATIENT
Start: 2023-11-07

## 2023-10-31 RX ORDER — DIPHENHYDRAMINE HYDROCHLORIDE 50 MG/ML
50 INJECTION INTRAMUSCULAR; INTRAVENOUS AS NEEDED
Status: DISCONTINUED | OUTPATIENT
Start: 2023-10-31 | End: 2023-10-31

## 2023-10-31 RX ADMIN — FERUMOXYTOL 510 MG: 510 INJECTION INTRAVENOUS at 09:50

## 2023-10-31 ASSESSMENT — PAIN SCALES - GENERAL: PAINLEVEL: 0-NO PAIN

## 2023-11-20 ENCOUNTER — TELEPHONE (OUTPATIENT)
Dept: PAIN MEDICINE | Facility: CLINIC | Age: 78
End: 2023-11-20
Payer: MEDICARE

## 2023-11-20 DIAGNOSIS — M48.062 NEUROGENIC CLAUDICATION DUE TO LUMBAR SPINAL STENOSIS: ICD-10-CM

## 2023-11-20 DIAGNOSIS — M54.12 CERVICAL RADICULITIS: ICD-10-CM

## 2023-11-20 DIAGNOSIS — G89.4 CHRONIC PAIN DISORDER: ICD-10-CM

## 2023-11-21 RX ORDER — TRAMADOL HYDROCHLORIDE 50 MG/1
50 TABLET ORAL 3 TIMES DAILY PRN
Qty: 90 TABLET | Refills: 0 | Status: SHIPPED | OUTPATIENT
Start: 2023-11-21 | End: 2023-12-19 | Stop reason: SDUPTHER

## 2023-11-28 ENCOUNTER — APPOINTMENT (OUTPATIENT)
Dept: PAIN MEDICINE | Facility: CLINIC | Age: 78
End: 2023-11-28
Payer: MEDICARE

## 2023-12-12 ENCOUNTER — LAB (OUTPATIENT)
Dept: LAB | Facility: LAB | Age: 78
End: 2023-12-12
Payer: MEDICARE

## 2023-12-12 DIAGNOSIS — D63.8 ANEMIA OF CHRONIC DISEASE: ICD-10-CM

## 2023-12-12 DIAGNOSIS — E61.1 IRON DEFICIENCY: ICD-10-CM

## 2023-12-12 LAB
ALBUMIN SERPL BCP-MCNC: 4.1 G/DL (ref 3.4–5)
ALP SERPL-CCNC: 67 U/L (ref 33–136)
ALT SERPL W P-5'-P-CCNC: 6 U/L (ref 10–52)
ANION GAP SERPL CALC-SCNC: 11 MMOL/L (ref 10–20)
AST SERPL W P-5'-P-CCNC: 10 U/L (ref 9–39)
BASOPHILS # BLD AUTO: 0.05 X10*3/UL (ref 0–0.1)
BASOPHILS NFR BLD AUTO: 0.7 %
BILIRUB SERPL-MCNC: 0.9 MG/DL (ref 0–1.2)
BUN SERPL-MCNC: 14 MG/DL (ref 6–23)
CALCIUM SERPL-MCNC: 9.4 MG/DL (ref 8.6–10.3)
CHLORIDE SERPL-SCNC: 103 MMOL/L (ref 98–107)
CO2 SERPL-SCNC: 30 MMOL/L (ref 21–32)
CREAT SERPL-MCNC: 0.66 MG/DL (ref 0.5–1.3)
EOSINOPHIL # BLD AUTO: 0.07 X10*3/UL (ref 0–0.4)
EOSINOPHIL NFR BLD AUTO: 1 %
ERYTHROCYTE [DISTWIDTH] IN BLOOD BY AUTOMATED COUNT: 20.7 % (ref 11.5–14.5)
FERRITIN SERPL-MCNC: 372 NG/ML (ref 20–300)
GFR SERPL CREATININE-BSD FRML MDRD: >90 ML/MIN/1.73M*2
GLUCOSE SERPL-MCNC: 89 MG/DL (ref 74–99)
HCT VFR BLD AUTO: 38.5 % (ref 41–52)
HGB BLD-MCNC: 11.9 G/DL (ref 13.5–17.5)
IMM GRANULOCYTES # BLD AUTO: 0.05 X10*3/UL (ref 0–0.5)
IMM GRANULOCYTES NFR BLD AUTO: 0.7 % (ref 0–0.9)
IRON SATN MFR SERPL: 30 % (ref 25–45)
IRON SERPL-MCNC: 84 UG/DL (ref 35–150)
LYMPHOCYTES # BLD AUTO: 1.08 X10*3/UL (ref 0.8–3)
LYMPHOCYTES NFR BLD AUTO: 15.9 %
MCH RBC QN AUTO: 28.3 PG (ref 26–34)
MCHC RBC AUTO-ENTMCNC: 30.9 G/DL (ref 32–36)
MCV RBC AUTO: 92 FL (ref 80–100)
MONOCYTES # BLD AUTO: 0.54 X10*3/UL (ref 0.05–0.8)
MONOCYTES NFR BLD AUTO: 8 %
NEUTROPHILS # BLD AUTO: 5 X10*3/UL (ref 1.6–5.5)
NEUTROPHILS NFR BLD AUTO: 73.7 %
NRBC BLD-RTO: 0 /100 WBCS (ref 0–0)
PLATELET # BLD AUTO: 387 X10*3/UL (ref 150–450)
POTASSIUM SERPL-SCNC: 4.4 MMOL/L (ref 3.5–5.3)
PROT SERPL-MCNC: 6.4 G/DL (ref 6.4–8.2)
RBC # BLD AUTO: 4.2 X10*6/UL (ref 4.5–5.9)
SODIUM SERPL-SCNC: 140 MMOL/L (ref 136–145)
TIBC SERPL-MCNC: 283 UG/DL (ref 240–445)
UIBC SERPL-MCNC: 199 UG/DL (ref 110–370)
VIT B12 SERPL-MCNC: 776 PG/ML (ref 211–911)
WBC # BLD AUTO: 6.8 X10*3/UL (ref 4.4–11.3)

## 2023-12-12 PROCEDURE — 83550 IRON BINDING TEST: CPT

## 2023-12-12 PROCEDURE — 82728 ASSAY OF FERRITIN: CPT

## 2023-12-12 PROCEDURE — 36415 COLL VENOUS BLD VENIPUNCTURE: CPT

## 2023-12-12 PROCEDURE — 83540 ASSAY OF IRON: CPT

## 2023-12-12 PROCEDURE — 85025 COMPLETE CBC W/AUTO DIFF WBC: CPT

## 2023-12-12 PROCEDURE — 82607 VITAMIN B-12: CPT

## 2023-12-12 PROCEDURE — 80053 COMPREHEN METABOLIC PANEL: CPT

## 2023-12-19 ENCOUNTER — OFFICE VISIT (OUTPATIENT)
Dept: HEMATOLOGY/ONCOLOGY | Facility: CLINIC | Age: 78
End: 2023-12-19
Payer: MEDICARE

## 2023-12-19 ENCOUNTER — TELEPHONE (OUTPATIENT)
Dept: PAIN MEDICINE | Facility: CLINIC | Age: 78
End: 2023-12-19

## 2023-12-19 VITALS
HEART RATE: 99 BPM | BODY MASS INDEX: 19.13 KG/M2 | HEIGHT: 66 IN | DIASTOLIC BLOOD PRESSURE: 74 MMHG | RESPIRATION RATE: 20 BRPM | SYSTOLIC BLOOD PRESSURE: 119 MMHG | OXYGEN SATURATION: 98 % | WEIGHT: 119 LBS

## 2023-12-19 DIAGNOSIS — E61.1 IRON DEFICIENCY: Primary | ICD-10-CM

## 2023-12-19 DIAGNOSIS — D63.8 ANEMIA OF CHRONIC DISEASE: ICD-10-CM

## 2023-12-19 DIAGNOSIS — M54.12 CERVICAL RADICULITIS: ICD-10-CM

## 2023-12-19 DIAGNOSIS — M48.062 NEUROGENIC CLAUDICATION DUE TO LUMBAR SPINAL STENOSIS: ICD-10-CM

## 2023-12-19 DIAGNOSIS — G89.4 CHRONIC PAIN DISORDER: ICD-10-CM

## 2023-12-19 PROCEDURE — 99213 OFFICE O/P EST LOW 20 MIN: CPT

## 2023-12-19 PROCEDURE — 1159F MED LIST DOCD IN RCRD: CPT

## 2023-12-19 PROCEDURE — 1126F AMNT PAIN NOTED NONE PRSNT: CPT

## 2023-12-19 PROCEDURE — 1036F TOBACCO NON-USER: CPT

## 2023-12-19 PROCEDURE — 1160F RVW MEDS BY RX/DR IN RCRD: CPT

## 2023-12-19 RX ORDER — TRAMADOL HYDROCHLORIDE 50 MG/1
50 TABLET ORAL 3 TIMES DAILY PRN
Qty: 90 TABLET | Refills: 0 | Status: SHIPPED | OUTPATIENT
Start: 2023-12-19 | End: 2024-01-23 | Stop reason: SDUPTHER

## 2023-12-19 ASSESSMENT — PAIN SCALES - GENERAL: PAINLEVEL: 0-NO PAIN

## 2023-12-19 NOTE — PATIENT INSTRUCTIONS
Iron parameters have corrected to normal values  Patient remains slightly anemic with a Hg of 11.9, likely secondary to chronic disease,   And inflammatory processes    Will plan for follow-up in 3 months with labs prior     Labs ordered (CBC w/diff, CMP, Ferritin, Iron Panel, B12)

## 2023-12-19 NOTE — PROGRESS NOTES
Patient ID: Olayinka Melara is a 78 y.o. male.    Subjective    HPI    Chief Complaint: Evaluation for anemia   Interval History:    PCP : Dr. Aaron     Reason for referral: Anemia     HPI  75 year old man with pmh of BPH, COPD, kyphoscoliosis, Lumbosacral spondylosis, referred for anemia. He has chronic anemia and progressively dropping Hg 11-12 g/dl last year to 8.6 g/dl in Sep 2021 until recently he had a visit to ER on 10/22/21 for SoB where his Hg was 7.0 g/dl. MCV is 64, his ferritin was 10 , b12 and ferritin were normal last year. He was transfused 1 unit PRBC and told to follow hematology  He has been feeling more tired recently , he has chronic fatigue that worsened near the time he went to the emergency room   He has chronic SOB and DUARTE from COPD and actually was having some hemoptysis with blood tinged sputum that day . This has improved   He has occasionally seeing melena around once per month   He has chronic  epigastric pain and occasional nausea   He is not active and does not have energy to do much activities   Weight fluctuating but mostly same range of 112-118   He is no urinary complaints   No changes in hair or nails      EGD on 1/21/22 showed erosive gastropathy with stigmata of bleeding, biopsies showed healing ulcers , no malignancy      interval history - 12/19/23     Energy is decent  Eating and drinking okay   Easily bruising   No bleeding issues     DUARTE stairs remains on 2.5L -3L NC O2  No chest pain or pressure   No fever, chills, or night sweats     No abdominal pain or cramping   No diarrhea or constipation   Frequent urination, no dysuria or hematuria     Occasional numbness or tingling in hands or feet  No new concerns       Objective    BSA: There is no height or weight on file to calculate BSA.  There were no vitals taken for this visit.     Physical Exam  Vitals and nursing note reviewed.   Constitutional:       General: He is not in acute distress.     Appearance: Normal  appearance.      Comments: Ambulates with walker    HENT:      Head: Normocephalic and atraumatic.      Mouth/Throat:      Mouth: Mucous membranes are moist.      Pharynx: Oropharynx is clear.   Eyes:      General: No scleral icterus.     Extraocular Movements: Extraocular movements intact.      Conjunctiva/sclera: Conjunctivae normal.   Cardiovascular:      Rate and Rhythm: Normal rate and regular rhythm.      Pulses: Normal pulses.      Heart sounds: Normal heart sounds.   Pulmonary:      Effort: Pulmonary effort is normal. No respiratory distress.      Breath sounds: Examination of the right-lower field reveals decreased breath sounds. Examination of the left-lower field reveals decreased breath sounds. Decreased breath sounds present.      Comments: Continuous oxygen 2.5-3L nc    Abdominal:      General: Bowel sounds are normal. There is no distension.      Palpations: Abdomen is soft. There is no mass.      Tenderness: There is no abdominal tenderness.   Musculoskeletal:         General: No swelling, tenderness or deformity. Normal range of motion.      Cervical back: Normal range of motion and neck supple.   Skin:     General: Skin is warm and dry.   Neurological:      General: No focal deficit present.      Mental Status: He is alert and oriented to person, place, and time.   Psychiatric:         Mood and Affect: Mood normal.         Behavior: Behavior normal.         Thought Content: Thought content normal.         Judgment: Judgment normal.         Performance Status:  Asymptomatic      Assessment/Plan      1. Anemia   secondary to iron deficiency on a likely AOCD  s/p one unit PRBC transfusion in ER in Oct 2021      have received 2 course of IV iron   improved Hg now to 11.4 g/dl and sufficient iron stores      remaining labs normal/ negative      EGD showed evidence of upper GI bleed with erosive gastropathy/ ulcer      11/29/22- His energy remains low and he feels weak. His breathing is stable, remains  "on 2L oxygen. Denies any abnormal bleeding or bruising issues. Is taking PO iron daily, and tolerating well.      He is slightly more anemic and iron deficient. Hgb (10.7), Iron Serum (70), Ferritin (34), TIBC (319), Tsat (22%), Vitamin B12 (527). Discussed with patient and his wife doing two doses of Venofer and will repeat labs in 8 weeks after second dose of Venofer.     10/12/23:  Energy continues to be low. Breathing is stable, home oxygen 2L nasal canula. No major bleeding events, patient does bruise easy.  No constitutional \"B\" symptoms reported.       He remains mildly anemic with improvement Hgb of 11.0g/dl.  Iron parameters are once again deficient. Fe 29, T sat 8%, Ferritin 29, B12 721.  Discussed ordering IV iron for iron replacement. Will continue to monitor labs closely.        12/19/23: Patient is doing well today. Denies any bleeding, remains bruising easily. Breathing remains stable, on continuous home oxygen of 2.5-3L nc. Denies any GI symptoms. Eating and drinking well. No constitutional \"B\" symptoms.    Reviewed labs: Hg 11.9, WBC 6.8, Plts 387k  Fe 84, Fe sat 30%, Ferritin 372, B12 776    Discussed iron parameters have corrected to normal values. Patient remains mildly anemic, likely secondary to chronic disease, and inflammatory processes. Will plan to monitor labs with no further intervention required at this time.       Labs ordered (CBC w/diff, CMP, Ferritin, Iron Panel, B12)        Plan:  1.. Labs prior to follow-up (CBC w/diff, CMP, Ferritin, Iron studies, B12)  3. Follow-up in 3 months     LUCIANA Zamora-CNP           "

## 2023-12-19 NOTE — PROGRESS NOTES
Registrant scheduled pt for 3 month follow up  Pt instructed to get labs at the hosp prior to appt  Reviewed AVS with patient- patient verbalizes understanding

## 2024-01-02 ENCOUNTER — OFFICE VISIT (OUTPATIENT)
Dept: PAIN MEDICINE | Facility: CLINIC | Age: 79
End: 2024-01-02
Payer: MEDICARE

## 2024-01-02 VITALS
DIASTOLIC BLOOD PRESSURE: 59 MMHG | BODY MASS INDEX: 19.82 KG/M2 | HEART RATE: 78 BPM | WEIGHT: 121 LBS | SYSTOLIC BLOOD PRESSURE: 88 MMHG

## 2024-01-02 DIAGNOSIS — M47.27 OSTEOARTHRITIS OF SPINE WITH RADICULOPATHY, LUMBOSACRAL REGION: Primary | ICD-10-CM

## 2024-01-02 DIAGNOSIS — G89.4 CHRONIC PAIN DISORDER: ICD-10-CM

## 2024-01-02 DIAGNOSIS — M48.062 NEUROGENIC CLAUDICATION DUE TO LUMBAR SPINAL STENOSIS: ICD-10-CM

## 2024-01-02 DIAGNOSIS — M54.17 LUMBOSACRAL RADICULOPATHY: ICD-10-CM

## 2024-01-02 LAB
AMPHETAMINES UR QL SCN: NORMAL
BARBITURATES UR QL SCN: NORMAL
BENZODIAZ UR QL SCN: NORMAL
BZE UR QL SCN: NORMAL
CANNABINOIDS UR QL SCN: NORMAL
FENTANYL+NORFENTANYL UR QL SCN: NORMAL
OPIATES UR QL SCN: NORMAL
OXYCODONE+OXYMORPHONE UR QL SCN: NORMAL
PCP UR QL SCN: NORMAL

## 2024-01-02 PROCEDURE — 99213 OFFICE O/P EST LOW 20 MIN: CPT | Performed by: PHYSICIAN ASSISTANT

## 2024-01-02 PROCEDURE — 80307 DRUG TEST PRSMV CHEM ANLYZR: CPT | Performed by: PHYSICIAN ASSISTANT

## 2024-01-02 ASSESSMENT — ENCOUNTER SYMPTOMS
ENDOCRINE NEGATIVE: 1
BACK PAIN: 1
EYES NEGATIVE: 1
PSYCHIATRIC NEGATIVE: 1
HEMATOLOGIC/LYMPHATIC NEGATIVE: 1
CONSTITUTIONAL NEGATIVE: 1
CARDIOVASCULAR NEGATIVE: 1
SHORTNESS OF BREATH: 1
GASTROINTESTINAL NEGATIVE: 1
ARTHRALGIAS: 1
WEAKNESS: 1
ALLERGIC/IMMUNOLOGIC NEGATIVE: 1
NUMBNESS: 1

## 2024-01-02 ASSESSMENT — COLUMBIA-SUICIDE SEVERITY RATING SCALE - C-SSRS
6. HAVE YOU EVER DONE ANYTHING, STARTED TO DO ANYTHING, OR PREPARED TO DO ANYTHING TO END YOUR LIFE?: NO
2. HAVE YOU ACTUALLY HAD ANY THOUGHTS OF KILLING YOURSELF?: NO
1. IN THE PAST MONTH, HAVE YOU WISHED YOU WERE DEAD OR WISHED YOU COULD GO TO SLEEP AND NOT WAKE UP?: NO

## 2024-01-02 ASSESSMENT — PAIN SCALES - GENERAL: PAINLEVEL: 7

## 2024-01-02 NOTE — PROGRESS NOTES
Subjective   Patient ID: Olayinka Melara is a 78 y.o. male who presents for Med Management (FOLLOW UP ON TRAMADOL, HE TAKES IT DAILY FOR HIS LOWER BACK PAIN, PAIN WITH SITTING, STANDING, SLEEPING, HER REPOSITIONS, HE HAS A TENS UNIT AT HOME AND HE GETS SOME RELIEF WHEN HE USES IT, ).HE GETS A SHARP PAIN DOWN THE RIGHT LEG THAT AFTER STANDING CAN GO TO HIS FOOT, TAKING TYLENOL PRN, HE PRESENTS ON OXYGEN AND AMBULATES WITH A ROLATOR, HE CAN WALK 15 FT WITHOUT HIS ROLATOR, HE CAN STAND UP TO 15 MINUTES, PAIN SCORE 7/10 ORT=0 THELMA=62%, BMI AGE, DEP-, SMOKING-  Patient is a 78-year-old male. He presents today for follow-up For medication management.  He continues to use tramadol 50 mg 1 p.o. 3 times daily as needed pain.  He also underwent previous right-sided L4-5 and L5-S1 transforaminal epidural steroid injection. This was done on 8/25/2023 and has given him 50% relief.   At this time, he states that things are overall going fairly well.  His pain is overall fairly stable.  He states that sometimes it can flare up to a 7/10 but he feels that the previous injection is still giving him improvement so he does not want to have another one yet.  He also does not require any refills.  He feels that things are not perfect but they are overall going fairly well.        Review of Systems   Constitutional: Negative.    HENT: Negative.     Eyes: Negative.    Respiratory:  Positive for shortness of breath.    Cardiovascular: Negative.    Gastrointestinal: Negative.    Endocrine: Negative.    Genitourinary: Negative.    Musculoskeletal:  Positive for arthralgias, back pain and gait problem.   Skin: Negative.    Allergic/Immunologic: Negative.    Neurological:  Positive for weakness and numbness.   Hematological: Negative.    Psychiatric/Behavioral: Negative.         Objective   Physical Exam  Vitals and nursing note reviewed.   Constitutional:       Appearance: Normal appearance. He is normal weight.   HENT:      Head:  Normocephalic and atraumatic.      Right Ear: External ear normal.      Left Ear: External ear normal.      Nose: Nose normal.      Mouth/Throat:      Mouth: Mucous membranes are moist.      Pharynx: Oropharynx is clear.   Cardiovascular:      Rate and Rhythm: Normal rate and regular rhythm.      Pulses: Normal pulses.   Pulmonary:      Effort: Respiratory distress present.      Comments: On continuous oxygen  Musculoskeletal:         General: Normal range of motion.      Cervical back: Normal range of motion.      Comments: Ambulating with a walker  5/5 lower extremity strength other than right hip flexion, ADF and EHL 4+ to 5 -/5   Skin:     General: Skin is warm and dry.   Neurological:      General: No focal deficit present.      Mental Status: He is alert and oriented to person, place, and time. Mental status is at baseline.   Psychiatric:         Mood and Affect: Mood normal.         Behavior: Behavior normal.         Thought Content: Thought content normal.         Judgment: Judgment normal.       MR lumbar spine wo IV contrast  Status: Final result     PACS Images     Show images for MR lumbar spine wo IV contrast  Signed by    Signed Time Phone Pager   Dora Tejeda MD 7/31/2023 11:02 [phone]     Exam Information    Status Exam Begun Exam Ended   Final  7/31/2023 08:27     Study Result    Narrative & Impression   Interpreted By:  DORA TEJEDA MD  MRN: [id number]  Patient Name: MONY REDDING     STUDY:  MRI L-SPINE WO;  7/31/2023 8:27 am     INDICATION:  pain  M48.062: Neurogenic claudication due to lumbar spinal stenosis  M54.17: Lumbosacral radiculopathy.     COMPARISON:  MRI of lumbar spine from 07/09/2020     ACCESSION NUMBER(S):  [id number]     ORDERING CLINICIAN:  BRAENNE KHANNA     TECHNIQUE:  Sagittal and axial T1 and T2 weighted images of the lumbar spine were  acquired. Sagittal STIR imaging was also performed     FINDINGS:  There is right convex scoliosis of the lumbar spine with  apex at L2.     The vertebral bodies demonstrate expected height. There are chronic  degenerative marrow signal changes at multiple levels. There is a  small focus of T1 hypointense and T2 hyperintense signal within left  aspect of sacrum S1 segment which appears slightly more pronounced as  compared to prior study. There is minimal T1 hyperintensity within  the center of this focus which likely reflects an atypical hemangioma.     The lower thoracic cord is unremarkable. The conus terminates  appropriately at L1. There is fatty infiltration of the filum  terminale as before.     There is desiccation and degeneration of several intervertebral discs  with multilevel anterior and posterior osteophytic spurring.     At L5-S1 there is prominent posterior osteophytic spurring and  bilateral facet hypertrophy. There is narrowing of right lateral  recess with mild overall central canal stenosis. There is moderate  right and minimal left neural foraminal narrowing, slightly worse as  compared to prior study.     At L4-L5 there is circumferential disc bulge with bilateral facet  hypertrophy and ligamentum flavum thickening. There is narrowing of  right lateral recess with mild overall central canal stenosis. There  is moderate to severe right and mild left neural foraminal narrowing.  The neural foramina appears slightly worse as compared to prior study.     At L3-L4 there is circumferential disc bulge with prominent posterior  osteophytic spurring and bilateral facet hypertrophy. There is  narrowing of bilateral lateral recesses with moderate to severe  central canal stenosis and severe right and moderate to severe left  neural foraminal narrowing. The findings have worsened since prior  study.     At L2-L3, there is prominent posterior osteophytic spurring with  bilateral facet hypertrophy and ligamentum flavum thickening. There  is moderate central canal stenosis with mild right and moderate to  severe left neural  foraminal narrowing. The overall findings appear  slightly worse as compared to prior study.     At L1-L2 there is posterior osteophytic spurring asymmetric to the  left with bilateral facet and ligamentum flavum hypertrophy. There is  narrowing of left lateral recess without overall central canal  stenosis. There is moderate left neural foraminal narrowing. The  findings are similar to prior study.     There is asymmetry in volume of bilateral anterior and posterior  paraspinous muscles.     IMPRESSION:  Lumbar spondylosis in the setting of right convex scoliosis affecting  multiple levels as detailed. Several of the findings have slightly  worsened as compared to prior MRI from 07/09/2020.       Assessment/Plan   Diagnoses and all orders for this visit:  Osteoarthritis of spine with radiculopathy, lumbosacral region  -     Drug Screen, Urine With Reflex to Confirmation  Lumbosacral radiculopathy  -     Drug Screen, Urine With Reflex to Confirmation  Chronic pain disorder  -     Drug Screen, Urine With Reflex to Confirmation  Neurogenic claudication due to lumbar spinal stenosis  -     Drug Screen, Urine With Reflex to Confirmation       Patient is a 78-year-old male with a past medical history significant for lumbar stenosis, lumbar neuritis, lumbar degenerative disease and lumbar disc bulge.  Previous sided L4-5 and L5-S1 transforaminal epidural steroid injection gave him 50% relief.  At this time, between the medications and the intermittent injections he states that things are overall going fairly well.  He is feeling fairly well.  He is fairly comfortable.  He does not feel that he requires repeat injection at this time.  We discussed repeating it should to be necessary but he does not feel that right now he needs to.  He would like to try to get through some of the winter.  OARRS was reviewed and is appropriate.  Narcan was offered and declined.  We will obtain updated UDS today for compliance purposes.  Most  recent UDS was also reviewed.  He will follow-up in 2 months.  Call clinic sooner if necessary.  Halle Donaldson CMA 01/02/24 10:03 AM

## 2024-01-23 ENCOUNTER — TELEPHONE (OUTPATIENT)
Dept: PAIN MEDICINE | Facility: CLINIC | Age: 79
End: 2024-01-23
Payer: MEDICARE

## 2024-01-23 DIAGNOSIS — M54.12 CERVICAL RADICULITIS: ICD-10-CM

## 2024-01-23 DIAGNOSIS — G89.4 CHRONIC PAIN DISORDER: ICD-10-CM

## 2024-01-23 DIAGNOSIS — M48.062 NEUROGENIC CLAUDICATION DUE TO LUMBAR SPINAL STENOSIS: ICD-10-CM

## 2024-01-23 RX ORDER — TRAMADOL HYDROCHLORIDE 50 MG/1
50 TABLET ORAL 3 TIMES DAILY PRN
Qty: 90 TABLET | Refills: 0 | Status: SHIPPED | OUTPATIENT
Start: 2024-01-23 | End: 2024-02-26 | Stop reason: SDUPTHER

## 2024-02-05 ENCOUNTER — OFFICE VISIT (OUTPATIENT)
Dept: PRIMARY CARE | Facility: CLINIC | Age: 79
End: 2024-02-05
Payer: MEDICARE

## 2024-02-05 VITALS
BODY MASS INDEX: 19.27 KG/M2 | HEART RATE: 114 BPM | DIASTOLIC BLOOD PRESSURE: 66 MMHG | OXYGEN SATURATION: 96 % | SYSTOLIC BLOOD PRESSURE: 112 MMHG | HEIGHT: 66 IN | WEIGHT: 119.89 LBS

## 2024-02-05 DIAGNOSIS — J41.8 MIXED SIMPLE AND MUCOPURULENT CHRONIC BRONCHITIS (MULTI): Primary | ICD-10-CM

## 2024-02-05 DIAGNOSIS — I71.23 ANEURYSM OF DESCENDING THORACIC AORTA WITHOUT RUPTURE (CMS-HCC): ICD-10-CM

## 2024-02-05 PROBLEM — R53.83 FATIGUE: Status: RESOLVED | Noted: 2023-04-13 | Resolved: 2024-02-05

## 2024-02-05 PROBLEM — R35.1 NOCTURIA: Status: RESOLVED | Noted: 2023-04-13 | Resolved: 2024-02-05

## 2024-02-05 PROBLEM — J40 BRONCHITIS: Status: RESOLVED | Noted: 2023-04-13 | Resolved: 2024-02-05

## 2024-02-05 PROBLEM — R63.4 WEIGHT LOSS: Status: RESOLVED | Noted: 2023-04-13 | Resolved: 2024-02-05

## 2024-02-05 PROBLEM — R04.2 HEMOPTYSIS: Status: RESOLVED | Noted: 2023-04-13 | Resolved: 2024-02-05

## 2024-02-05 PROBLEM — R35.0 URINARY FREQUENCY: Status: RESOLVED | Noted: 2023-04-13 | Resolved: 2024-02-05

## 2024-02-05 PROCEDURE — 1157F ADVNC CARE PLAN IN RCRD: CPT | Performed by: FAMILY MEDICINE

## 2024-02-05 PROCEDURE — 1159F MED LIST DOCD IN RCRD: CPT | Performed by: FAMILY MEDICINE

## 2024-02-05 PROCEDURE — 1036F TOBACCO NON-USER: CPT | Performed by: FAMILY MEDICINE

## 2024-02-05 PROCEDURE — 1160F RVW MEDS BY RX/DR IN RCRD: CPT | Performed by: FAMILY MEDICINE

## 2024-02-05 PROCEDURE — 1125F AMNT PAIN NOTED PAIN PRSNT: CPT | Performed by: FAMILY MEDICINE

## 2024-02-05 PROCEDURE — 99214 OFFICE O/P EST MOD 30 MIN: CPT | Performed by: FAMILY MEDICINE

## 2024-02-05 RX ORDER — AZITHROMYCIN 250 MG/1
TABLET, FILM COATED ORAL
Qty: 6 TABLET | Refills: 0 | Status: SHIPPED | OUTPATIENT
Start: 2024-02-05 | End: 2024-02-09

## 2024-02-05 RX ORDER — PREDNISONE 20 MG/1
40 TABLET ORAL DAILY
Qty: 10 TABLET | Refills: 0 | Status: SHIPPED | OUTPATIENT
Start: 2024-02-05 | End: 2024-02-10

## 2024-02-05 ASSESSMENT — ENCOUNTER SYMPTOMS
SWEATS: 0
FEVER: 0
HEADACHES: 1
MYALGIAS: 0
WEIGHT LOSS: 0
RHINORRHEA: 1
SHORTNESS OF BREATH: 0
WHEEZING: 1
CHILLS: 0
COUGH: 1
HEMOPTYSIS: 0
HEARTBURN: 0
SORE THROAT: 0

## 2024-02-05 ASSESSMENT — PATIENT HEALTH QUESTIONNAIRE - PHQ9
SUM OF ALL RESPONSES TO PHQ9 QUESTIONS 1 AND 2: 0
2. FEELING DOWN, DEPRESSED OR HOPELESS: NOT AT ALL
1. LITTLE INTEREST OR PLEASURE IN DOING THINGS: NOT AT ALL

## 2024-02-05 NOTE — PROGRESS NOTES
Subjective   Patient ID: Olayinka Melara is a 78 y.o. male who presents for Cough (X5days/HEADACHES AND FATIGUE).    Cough  This is a new problem. The current episode started in the past 7 days. The problem has been waxing and waning. The problem occurs every few minutes. The cough is Non-productive. Associated symptoms include headaches, rhinorrhea and wheezing. Pertinent negatives include no chest pain, chills, ear congestion, ear pain, fever, heartburn, hemoptysis, myalgias, nasal congestion, postnasal drip, rash, sore throat, shortness of breath, sweats or weight loss. Nothing aggravates the symptoms.    Been feeling sick over the last 5 days.  Not coughing up a whole lot more than he usually does, some shortness of breath.  Pulse ox is 96% on oxygen  Decreased air exchange overall, but no adventitious sounds.  Z-Santosh  Prednisone for 5 days  Continue oxygen    Note from his previous lung doctor for follow-up nodule CT of the chest.  Talked about descending thoracic aorta being about 3 to 3.2 cm.  That was done in [place].  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  Posterior also comments about ectasia in the abdominal aorta 3 cm.  CT of the chest from 2021 showed no changes.  Discussed with the patient and his wife about the seriousness of the condition, but at least it does not appear to be getting any worse in the last year.  Also would be difficult to perform procedure on him with his significantly bad.  Not sure if you are going to order a follow-up exam, but I would wait at least a year, so the end of 2024.      Review of Systems   Constitutional:  Negative for chills, fever and weight loss.   HENT:  Positive for rhinorrhea. Negative for ear pain, postnasal drip and sore throat.    Respiratory:  Positive for cough and wheezing. Negative for hemoptysis and shortness of breath.    Cardiovascular:  Negative for chest pain.   Gastrointestinal:  Negative  "for heartburn.   Musculoskeletal:  Negative for myalgias.   Skin:  Negative for rash.   Neurological:  Positive for headaches.       Objective   /66   Pulse (!) 114   Ht 1.676 m (5' 6\")   Wt 54.4 kg (119 lb 14.2 oz)   SpO2 96%   BMI 19.35 kg/m²     Physical Exam  Constitutional:       Appearance: Normal appearance.   HENT:      Head: Normocephalic and atraumatic.   Cardiovascular:      Rate and Rhythm: Normal rate and regular rhythm.      Heart sounds: Normal heart sounds.   Pulmonary:      Effort: Pulmonary effort is normal.      Breath sounds: Normal breath sounds.   Skin:     General: Skin is warm and dry.   Neurological:      General: No focal deficit present.      Mental Status: He is alert and oriented to person, place, and time.   Psychiatric:         Mood and Affect: Mood normal.         Behavior: Behavior normal.         Thought Content: Thought content normal.         Judgment: Judgment normal.         Assessment/Plan   Problem List Items Addressed This Visit             ICD-10-CM    COPD (chronic obstructive pulmonary disease) (CMS/[facility]) - Primary J44.9    Relevant Medications    azithromycin (Zithromax) 250 mg tablet    predniSONE (Deltasone) 20 mg tablet          "

## 2024-02-12 DIAGNOSIS — N40.1 BENIGN PROSTATIC HYPERPLASIA WITH NOCTURIA: ICD-10-CM

## 2024-02-12 DIAGNOSIS — R35.1 BENIGN PROSTATIC HYPERPLASIA WITH NOCTURIA: ICD-10-CM

## 2024-02-12 RX ORDER — TAMSULOSIN HYDROCHLORIDE 0.4 MG/1
0.4 CAPSULE ORAL DAILY
Qty: 90 CAPSULE | Refills: 3 | Status: SHIPPED | OUTPATIENT
Start: 2024-02-12 | End: 2025-02-11

## 2024-02-26 ENCOUNTER — TELEPHONE (OUTPATIENT)
Dept: PAIN MEDICINE | Facility: CLINIC | Age: 79
End: 2024-02-26
Payer: MEDICARE

## 2024-02-26 DIAGNOSIS — M48.062 NEUROGENIC CLAUDICATION DUE TO LUMBAR SPINAL STENOSIS: ICD-10-CM

## 2024-02-26 DIAGNOSIS — G89.4 CHRONIC PAIN DISORDER: ICD-10-CM

## 2024-02-26 DIAGNOSIS — M54.12 CERVICAL RADICULITIS: ICD-10-CM

## 2024-02-26 RX ORDER — TRAMADOL HYDROCHLORIDE 50 MG/1
50 TABLET ORAL 3 TIMES DAILY PRN
Qty: 90 TABLET | Refills: 0 | Status: SHIPPED | OUTPATIENT
Start: 2024-02-26 | End: 2024-03-25 | Stop reason: SDUPTHER

## 2024-03-07 ENCOUNTER — OFFICE VISIT (OUTPATIENT)
Dept: PAIN MEDICINE | Facility: CLINIC | Age: 79
End: 2024-03-07
Payer: MEDICARE

## 2024-03-07 VITALS
BODY MASS INDEX: 19.44 KG/M2 | HEIGHT: 66 IN | WEIGHT: 121 LBS | DIASTOLIC BLOOD PRESSURE: 72 MMHG | RESPIRATION RATE: 22 BRPM | HEART RATE: 59 BPM | SYSTOLIC BLOOD PRESSURE: 124 MMHG

## 2024-03-07 DIAGNOSIS — M47.27 OSTEOARTHRITIS OF SPINE WITH RADICULOPATHY, LUMBOSACRAL REGION: ICD-10-CM

## 2024-03-07 DIAGNOSIS — M54.17 LUMBOSACRAL RADICULOPATHY: ICD-10-CM

## 2024-03-07 DIAGNOSIS — M48.062 NEUROGENIC CLAUDICATION DUE TO LUMBAR SPINAL STENOSIS: Primary | ICD-10-CM

## 2024-03-07 PROCEDURE — 99214 OFFICE O/P EST MOD 30 MIN: CPT | Performed by: PHYSICIAN ASSISTANT

## 2024-03-07 ASSESSMENT — ENCOUNTER SYMPTOMS
SHORTNESS OF BREATH: 1
ALLERGIC/IMMUNOLOGIC NEGATIVE: 1
EYES NEGATIVE: 1
ENDOCRINE NEGATIVE: 1
BACK PAIN: 1
GASTROINTESTINAL NEGATIVE: 1
PSYCHIATRIC NEGATIVE: 1
ARTHRALGIAS: 1
NUMBNESS: 1
MYALGIAS: 1
CONSTITUTIONAL NEGATIVE: 1
WEAKNESS: 1
CARDIOVASCULAR NEGATIVE: 1
HEMATOLOGIC/LYMPHATIC NEGATIVE: 1

## 2024-03-07 ASSESSMENT — PAIN SCALES - GENERAL
PAINLEVEL_OUTOF10: 7
PAINLEVEL: 7

## 2024-03-07 ASSESSMENT — PAIN DESCRIPTION - DESCRIPTORS: DESCRIPTORS: ACHING

## 2024-03-07 ASSESSMENT — PAIN - FUNCTIONAL ASSESSMENT: PAIN_FUNCTIONAL_ASSESSMENT: 0-10

## 2024-03-07 NOTE — PROGRESS NOTES
Subjective   Patient ID: Olayinka Melara is a 78 y.o. male who presents for Back Pain (Patient complains of pain in his lower back.  He rates this pain a 7/10 at this time.  He states his pain is in his lower back and radiating down his right upper leg.  Sometimes down his left leg but usually the right.  He states he has intermittent numbness in his right leg.  He rates his pain a 7/10.  Patient describes his pain as aching.  Patient states his pain increases with activity such as standing or walking.).  He is using a wheeled walker to ambulate into clinic today.      Falls screen completed, patient denied any falls in the last year.  Alcohol screen completed, negative.  THELMA score 40.    Sondra Gross RN 03/07/24 10:07 AM     Patient is a 78-year-old male. He presents today for follow-up for medication management.  He continues to use tramadol 50 mg 1 p.o. 3 times daily as needed pain.  He tolerates this well.  No side effects.  He takes as prescribed.  Does not require refill.  He underwent previous right-sided L4-5 and L5-S1 transforaminal epidural steroid injection. This was done on 8/25/2023 and has given him 50% relief.  Unfortunately, he is once again noticing pain.  Mainly in the right thigh.  It intermittently goes into the calf but it is mainly in the thigh.  He currently rates the discomfort a 7/10.  This affects his ambulatory status.  This affects his quality of life.  This affects his activities and affects his ability to do the things he wants to do.  He wonders about having another injection as he feels that this in conjunction with the medication gives him benefit.        Review of Systems   Constitutional: Negative.    HENT: Negative.     Eyes: Negative.    Respiratory:  Positive for shortness of breath.    Cardiovascular: Negative.    Gastrointestinal: Negative.    Endocrine: Negative.    Genitourinary: Negative.    Musculoskeletal:  Positive for arthralgias, back pain, gait problem and  myalgias.   Allergic/Immunologic: Negative.    Neurological:  Positive for weakness and numbness.   Hematological: Negative.    Psychiatric/Behavioral: Negative.         Objective   Physical Exam  Vitals and nursing note reviewed.   Constitutional:       Appearance: Normal appearance. He is normal weight.   HENT:      Head: Normocephalic and atraumatic.      Right Ear: External ear normal.      Left Ear: External ear normal.      Nose: Nose normal.      Mouth/Throat:      Pharynx: Oropharynx is clear.   Eyes:      Conjunctiva/sclera: Conjunctivae normal.   Cardiovascular:      Rate and Rhythm: Normal rate and regular rhythm.      Pulses: Normal pulses.   Pulmonary:      Comments: On oxygen.  Slightly short of breath.  Musculoskeletal:         General: Normal range of motion.      Cervical back: Normal range of motion.      Comments: Ambulating with a walker  5/5 lower extremity strength other than right hip flexion, ADF and EHL 4+ to 5 -/5      Skin:     General: Skin is warm and dry.   Neurological:      General: No focal deficit present.      Mental Status: He is alert and oriented to person, place, and time. Mental status is at baseline.   Psychiatric:         Mood and Affect: Mood normal.         Behavior: Behavior normal.         Thought Content: Thought content normal.         Judgment: Judgment normal.       MR lumbar spine wo IV contrast  Status: Final result     PACS Images     Show images for MR lumbar spine wo IV contrast  Signed by    Signed Time Phone Pager   Dora Tejeda MD 7/31/2023 11:02 [phone]     Exam Information    Status Exam Begun Exam Ended   Final  7/31/2023 08:27     Study Result    Narrative   Interpreted By:  DORA TEJEDA MD  MRN: [id number]  Patient Name: MONY REDDING     STUDY:  MRI L-SPINE WO;  7/31/2023 8:27 am     INDICATION:  pain  M48.062: Neurogenic claudication due to lumbar spinal stenosis  M54.17: Lumbosacral radiculopathy.     COMPARISON:  MRI of lumbar spine  from 07/09/2020     ACCESSION NUMBER(S):  [id number]     ORDERING CLINICIAN:  BREANNE KHANNA     TECHNIQUE:  Sagittal and axial T1 and T2 weighted images of the lumbar spine were  acquired. Sagittal STIR imaging was also performed     FINDINGS:  There is right convex scoliosis of the lumbar spine with apex at L2.     The vertebral bodies demonstrate expected height. There are chronic  degenerative marrow signal changes at multiple levels. There is a  small focus of T1 hypointense and T2 hyperintense signal within left  aspect of sacrum S1 segment which appears slightly more pronounced as  compared to prior study. There is minimal T1 hyperintensity within  the center of this focus which likely reflects an atypical hemangioma.     The lower thoracic cord is unremarkable. The conus terminates  appropriately at L1. There is fatty infiltration of the filum  terminale as before.     There is desiccation and degeneration of several intervertebral discs  with multilevel anterior and posterior osteophytic spurring.     At L5-S1 there is prominent posterior osteophytic spurring and  bilateral facet hypertrophy. There is narrowing of right lateral  recess with mild overall central canal stenosis. There is moderate  right and minimal left neural foraminal narrowing, slightly worse as  compared to prior study.     At L4-L5 there is circumferential disc bulge with bilateral facet  hypertrophy and ligamentum flavum thickening. There is narrowing of  right lateral recess with mild overall central canal stenosis. There  is moderate to severe right and mild left neural foraminal narrowing.  The neural foramina appears slightly worse as compared to prior study.     At L3-L4 there is circumferential disc bulge with prominent posterior  osteophytic spurring and bilateral facet hypertrophy. There is  narrowing of bilateral lateral recesses with moderate to severe  central canal stenosis and severe right and moderate to severe left  neural  foraminal narrowing. The findings have worsened since prior  study.     At L2-L3, there is prominent posterior osteophytic spurring with  bilateral facet hypertrophy and ligamentum flavum thickening. There  is moderate central canal stenosis with mild right and moderate to  severe left neural foraminal narrowing. The overall findings appear  slightly worse as compared to prior study.     At L1-L2 there is posterior osteophytic spurring asymmetric to the  left with bilateral facet and ligamentum flavum hypertrophy. There is  narrowing of left lateral recess without overall central canal  stenosis. There is moderate left neural foraminal narrowing. The  findings are similar to prior study.     There is asymmetry in volume of bilateral anterior and posterior  paraspinous muscles.      Impression   Lumbar spondylosis in the setting of right convex scoliosis affecting  multiple levels as detailed. Several of the findings have slightly  worsened as compared to prior MRI from 07/09/2020.     Result History    MR lumbar spine wo IV contrast (Order #[id number]) on 7/31/2023 - Order Result History Report    Assessment/Plan   Diagnoses and all orders for this visit:  Neurogenic claudication due to lumbar spinal stenosis  Lumbosacral radiculopathy  Osteoarthritis of spine with radiculopathy, lumbosacral region       Patient is a 78-year-old male with a past medical history significant for lumbosacral stenosis and lumbosacral neuritis.  Previous transforaminal epidural steroid injection gave him 50% relief if not more.  At this time, he is once again noticing right radiating leg pain.  This is mainly in his thigh and intermittently goes into his calf.  We once again reviewed his MRI.  Based on his MRI findings, his pain pattern, and his failure to improve with previous conservative treatments as well as the significant response he got from the previous injections I recommended a right-sided L3-4 and L4-5 transforaminal epidural  steroid injection under fluoroscopy.  Diagnosis-M54.17-lumbosacral neuritis.  Procedure was discussed.  Risk and benefits were discussed.  Patient is agreeable.  He will follow-up 2 weeks after the injection for reevaluation.  Call clinic sooner if necessary.  Patient is using  tramadol 50 mg 1 p.o. 3 times daily as needed pain.  He tolerates well.  No side effects but he is not require refill.  He will call when he does.  OARRS was reviewed and is appropriate.  Narcan has been offered and declined.  Most recent UDS was also reviewed and is appropriate.

## 2024-03-12 ENCOUNTER — TELEPHONE (OUTPATIENT)
Dept: PAIN MEDICINE | Facility: CLINIC | Age: 79
End: 2024-03-12
Payer: MEDICARE

## 2024-03-19 ENCOUNTER — APPOINTMENT (OUTPATIENT)
Dept: HEMATOLOGY/ONCOLOGY | Facility: CLINIC | Age: 79
End: 2024-03-19
Payer: MEDICARE

## 2024-03-21 ENCOUNTER — APPOINTMENT (OUTPATIENT)
Dept: HEMATOLOGY/ONCOLOGY | Facility: CLINIC | Age: 79
End: 2024-03-21
Payer: MEDICARE

## 2024-03-25 ENCOUNTER — TELEPHONE (OUTPATIENT)
Dept: PAIN MEDICINE | Facility: CLINIC | Age: 79
End: 2024-03-25

## 2024-03-25 ENCOUNTER — LAB (OUTPATIENT)
Dept: LAB | Facility: LAB | Age: 79
End: 2024-03-25
Payer: MEDICARE

## 2024-03-25 DIAGNOSIS — M54.12 CERVICAL RADICULITIS: ICD-10-CM

## 2024-03-25 DIAGNOSIS — G89.4 CHRONIC PAIN DISORDER: ICD-10-CM

## 2024-03-25 DIAGNOSIS — M48.062 NEUROGENIC CLAUDICATION DUE TO LUMBAR SPINAL STENOSIS: ICD-10-CM

## 2024-03-25 DIAGNOSIS — E61.1 IRON DEFICIENCY: ICD-10-CM

## 2024-03-25 DIAGNOSIS — D63.8 ANEMIA OF CHRONIC DISEASE: ICD-10-CM

## 2024-03-25 LAB
ALBUMIN SERPL BCP-MCNC: 4 G/DL (ref 3.4–5)
ALP SERPL-CCNC: 71 U/L (ref 33–136)
ALT SERPL W P-5'-P-CCNC: 5 U/L (ref 10–52)
ANION GAP SERPL CALC-SCNC: 11 MMOL/L (ref 10–20)
AST SERPL W P-5'-P-CCNC: 10 U/L (ref 9–39)
BASOPHILS # BLD AUTO: 0.08 X10*3/UL (ref 0–0.1)
BASOPHILS NFR BLD AUTO: 0.9 %
BILIRUB SERPL-MCNC: 0.7 MG/DL (ref 0–1.2)
BUN SERPL-MCNC: 19 MG/DL (ref 6–23)
CALCIUM SERPL-MCNC: 9.3 MG/DL (ref 8.6–10.3)
CHLORIDE SERPL-SCNC: 100 MMOL/L (ref 98–107)
CO2 SERPL-SCNC: 32 MMOL/L (ref 21–32)
CREAT SERPL-MCNC: 0.67 MG/DL (ref 0.5–1.3)
EGFRCR SERPLBLD CKD-EPI 2021: >90 ML/MIN/1.73M*2
EOSINOPHIL # BLD AUTO: 0.07 X10*3/UL (ref 0–0.4)
EOSINOPHIL NFR BLD AUTO: 0.8 %
ERYTHROCYTE [DISTWIDTH] IN BLOOD BY AUTOMATED COUNT: 16.1 % (ref 11.5–14.5)
FERRITIN SERPL-MCNC: 21 NG/ML (ref 20–300)
GLUCOSE SERPL-MCNC: 99 MG/DL (ref 74–99)
HCT VFR BLD AUTO: 35.5 % (ref 41–52)
HGB BLD-MCNC: 10.8 G/DL (ref 13.5–17.5)
IMM GRANULOCYTES # BLD AUTO: 0.1 X10*3/UL (ref 0–0.5)
IMM GRANULOCYTES NFR BLD AUTO: 1.1 % (ref 0–0.9)
IRON SATN MFR SERPL: 11 % (ref 25–45)
IRON SERPL-MCNC: 39 UG/DL (ref 35–150)
LYMPHOCYTES # BLD AUTO: 1.02 X10*3/UL (ref 0.8–3)
LYMPHOCYTES NFR BLD AUTO: 11 %
MCH RBC QN AUTO: 27.9 PG (ref 26–34)
MCHC RBC AUTO-ENTMCNC: 30.4 G/DL (ref 32–36)
MCV RBC AUTO: 92 FL (ref 80–100)
MONOCYTES # BLD AUTO: 0.45 X10*3/UL (ref 0.05–0.8)
MONOCYTES NFR BLD AUTO: 4.9 %
NEUTROPHILS # BLD AUTO: 7.53 X10*3/UL (ref 1.6–5.5)
NEUTROPHILS NFR BLD AUTO: 81.3 %
NRBC BLD-RTO: 0 /100 WBCS (ref 0–0)
PLATELET # BLD AUTO: 403 X10*3/UL (ref 150–450)
POTASSIUM SERPL-SCNC: 4.5 MMOL/L (ref 3.5–5.3)
PROT SERPL-MCNC: 6.6 G/DL (ref 6.4–8.2)
RBC # BLD AUTO: 3.87 X10*6/UL (ref 4.5–5.9)
SODIUM SERPL-SCNC: 138 MMOL/L (ref 136–145)
TIBC SERPL-MCNC: 356 UG/DL (ref 240–445)
UIBC SERPL-MCNC: 317 UG/DL (ref 110–370)
VIT B12 SERPL-MCNC: 620 PG/ML (ref 211–911)
WBC # BLD AUTO: 9.3 X10*3/UL (ref 4.4–11.3)

## 2024-03-25 PROCEDURE — 83540 ASSAY OF IRON: CPT

## 2024-03-25 PROCEDURE — 83550 IRON BINDING TEST: CPT

## 2024-03-25 PROCEDURE — 85025 COMPLETE CBC W/AUTO DIFF WBC: CPT

## 2024-03-25 PROCEDURE — 36415 COLL VENOUS BLD VENIPUNCTURE: CPT

## 2024-03-25 PROCEDURE — 82607 VITAMIN B-12: CPT

## 2024-03-25 PROCEDURE — 80053 COMPREHEN METABOLIC PANEL: CPT

## 2024-03-25 PROCEDURE — 82728 ASSAY OF FERRITIN: CPT

## 2024-03-25 RX ORDER — TRAMADOL HYDROCHLORIDE 50 MG/1
50 TABLET ORAL 3 TIMES DAILY PRN
Qty: 90 TABLET | Refills: 0 | Status: SHIPPED | OUTPATIENT
Start: 2024-03-25 | End: 2024-04-23 | Stop reason: SDUPTHER

## 2024-03-28 ENCOUNTER — OFFICE VISIT (OUTPATIENT)
Dept: HEMATOLOGY/ONCOLOGY | Facility: CLINIC | Age: 79
End: 2024-03-28
Payer: MEDICARE

## 2024-03-28 VITALS
WEIGHT: 119 LBS | TEMPERATURE: 97.2 F | RESPIRATION RATE: 24 BRPM | HEIGHT: 66 IN | OXYGEN SATURATION: 99 % | DIASTOLIC BLOOD PRESSURE: 67 MMHG | BODY MASS INDEX: 19.13 KG/M2 | SYSTOLIC BLOOD PRESSURE: 102 MMHG | HEART RATE: 78 BPM

## 2024-03-28 DIAGNOSIS — D63.8 ANEMIA OF CHRONIC DISEASE: Primary | ICD-10-CM

## 2024-03-28 DIAGNOSIS — E61.1 IRON DEFICIENCY: ICD-10-CM

## 2024-03-28 PROCEDURE — 99214 OFFICE O/P EST MOD 30 MIN: CPT

## 2024-03-28 PROCEDURE — 1160F RVW MEDS BY RX/DR IN RCRD: CPT

## 2024-03-28 PROCEDURE — 1157F ADVNC CARE PLAN IN RCRD: CPT

## 2024-03-28 PROCEDURE — 1126F AMNT PAIN NOTED NONE PRSNT: CPT

## 2024-03-28 PROCEDURE — 1159F MED LIST DOCD IN RCRD: CPT

## 2024-03-28 RX ORDER — EPINEPHRINE 0.3 MG/.3ML
0.3 INJECTION SUBCUTANEOUS EVERY 5 MIN PRN
Status: CANCELLED | OUTPATIENT
Start: 2024-03-28

## 2024-03-28 RX ORDER — FAMOTIDINE 10 MG/ML
20 INJECTION INTRAVENOUS ONCE AS NEEDED
Status: CANCELLED | OUTPATIENT
Start: 2024-03-28

## 2024-03-28 RX ORDER — HEPARIN 100 UNIT/ML
500 SYRINGE INTRAVENOUS AS NEEDED
Status: CANCELLED | OUTPATIENT
Start: 2024-03-28

## 2024-03-28 RX ORDER — DIPHENHYDRAMINE HYDROCHLORIDE 50 MG/ML
50 INJECTION INTRAMUSCULAR; INTRAVENOUS AS NEEDED
Status: CANCELLED | OUTPATIENT
Start: 2024-03-28

## 2024-03-28 RX ORDER — ALBUTEROL SULFATE 0.83 MG/ML
3 SOLUTION RESPIRATORY (INHALATION) AS NEEDED
Status: CANCELLED | OUTPATIENT
Start: 2024-03-28

## 2024-03-28 RX ORDER — HEPARIN SODIUM,PORCINE/PF 10 UNIT/ML
50 SYRINGE (ML) INTRAVENOUS AS NEEDED
Status: CANCELLED | OUTPATIENT
Start: 2024-03-28

## 2024-03-28 ASSESSMENT — PAIN SCALES - GENERAL: PAINLEVEL: 0-NO PAIN

## 2024-03-28 NOTE — PROGRESS NOTES
Pt to get 2 doses of IV feraheme- will call pt  to schedule once approved.  Labs and OV 6 to 8 weeks after 2nd dose

## 2024-03-28 NOTE — PATIENT INSTRUCTIONS
Ordered 2 doses of Feraheme to be given upon insurance approval  Fecal Occult - Stool Sample ordered for patient to drop off at lab  RTC 6-8 weeks post iron infusion with labs prior    (CBC w/diff, CMP, Ferritin, Iron Panel, B12)

## 2024-03-28 NOTE — PROGRESS NOTES
Patient ID: Olayinka Melara is a 78 y.o. male.    Subjective    HPI    HPI     Chief Complaint: Evaluation for anemia   Interval History:    PCP : Dr. Aaron     Reason for referral: Anemia     HPI  75 year old man with pmh of BPH, COPD, kyphoscoliosis, Lumbosacral spondylosis, referred for anemia. He has chronic anemia and progressively dropping Hg 11-12 g/dl last year to 8.6 g/dl in Sep 2021 until recently he had a visit to ER on 10/22/21 for SoB where his Hg was 7.0 g/dl. MCV is 64, his ferritin was 10 , b12 and ferritin were normal last year. He was transfused 1 unit PRBC and told to follow hematology  He has been feeling more tired recently , he has chronic fatigue that worsened near the time he went to the emergency room   He has chronic SOB and DUARTE from COPD and actually was having some hemoptysis with blood tinged sputum that day . This has improved   He has occasionally seeing melena around once per month   He has chronic  epigastric pain and occasional nausea   He is not active and does not have energy to do much activities   Weight fluctuating but mostly same range of 112-118   He is no urinary complaints   No changes in hair or nails      EGD on 1/21/22 showed erosive gastropathy with stigmata of bleeding, biopsies showed healing ulcers , no malignancy      interval history - 3/28/24     Pulmonary rehab 3 times weekly the past two weeks, feels like it is helping  Legs don't feel so tired    Energy varies from day to day   Eating and drinking okay   Bruises easily   No abnormal bleeding events    DUARTE stairs remains on 2.5L -3L NC O2\  Pulse ox  ranges from 95-97%  No chest pain or pressure   No fever, chills  Occasional night sweats 2-3 times weekly    Started taking Tessalon Pearls reports having a bloody nose the next day   And subsequent days after taking the medication he started coughing up blood   Since discontinuing Tessalon Pearls, all symptoms have resolved    No abdominal pain /cramping   No  diarrhea or constipation   Frequent urination, no dysuria or hematuria     Occasional numbness or tingling in hands or feet  No new concerns     Objective    BSA: There is no height or weight on file to calculate BSA.  There were no vitals taken for this visit.     Physical Exam  Vitals and nursing note reviewed.   Constitutional:       Appearance: Normal appearance.      Comments: Ambulates with a rollator    HENT:      Head: Normocephalic and atraumatic.      Mouth/Throat:      Pharynx: Oropharynx is clear.   Eyes:      General: No scleral icterus.     Extraocular Movements: Extraocular movements intact.      Conjunctiva/sclera: Conjunctivae normal.   Cardiovascular:      Rate and Rhythm: Normal rate and regular rhythm.      Pulses: Normal pulses.      Heart sounds: Normal heart sounds.   Pulmonary:      Effort: Respiratory distress present.      Comments: Continuous home oxygen 2-3L NC  Abdominal:      General: There is no distension.      Palpations: Abdomen is soft. There is no mass.      Tenderness: There is no abdominal tenderness.   Musculoskeletal:         General: Normal range of motion.      Cervical back: Normal range of motion.   Skin:     General: Skin is warm and dry.   Neurological:      General: No focal deficit present.      Mental Status: He is alert and oriented to person, place, and time.      Motor: Weakness present.   Psychiatric:         Mood and Affect: Mood normal.         Behavior: Behavior normal.         Thought Content: Thought content normal.         Judgment: Judgment normal.         Performance Status:  Symptomatic; fully ambulatory      Assessment/Plan        1. Anemia   secondary to iron deficiency on a likely AOCD  s/p one unit PRBC transfusion in ER in Oct 2021      have received 2 course of IV iron   improved Hg now to 11.4 g/dl and sufficient iron stores      remaining labs normal/ negative      EGD showed evidence of upper GI bleed with erosive gastropathy/ ulcer     "  11/29/22- His energy remains low and he feels weak. His breathing is stable, remains on 2L oxygen. Denies any abnormal bleeding or bruising issues. Is taking PO iron daily, and tolerating well.      He is slightly more anemic and iron deficient. Hgb (10.7), Iron Serum (70), Ferritin (34), TIBC (319), Tsat (22%), Vitamin B12 (527). Discussed with patient and his wife doing two doses of Venofer and will repeat labs in 8 weeks after second dose of Venofer.     10/12/23:  Energy continues to be low. Breathing is stable, home oxygen 2L nasal canula. No major bleeding events, patient does bruise easy.  No constitutional \"B\" symptoms reported.       He remains mildly anemic with improvement Hgb of 11.0g/dl.  Iron parameters are once again deficient. Fe 29, T sat 8%, Ferritin 29, B12 721.  Discussed ordering IV iron for iron replacement. Will continue to monitor labs closely.         3/28/24: Patient is currently in pulmonary rehab, feels as though it is helping, legs don't feel as tired. DUARTE with stairs. Remains on home oxygen 2.5L at rest 3L with activity. Energy varies from day day to day. Continues to bruise easily. Patient report episodes of a bloody nose and coughing up blood after taking Tessalon Pearls, patient has discontinued taking and symptoms have resolved. No GI or Urinary issues.  Eating and drinking well. No constitutional \"B\" symptoms.     Reviewed labs: Hg 10.8, WBC 9.3, Plts 403k  Fe 39, Fe sat 11%, Ferritin 21, B12 620     Patients Hg has trended down to 10.8, likely secondary to chronic disease, and inflammatory processes. Iron parameters have also trended down- discussed ordering a course of IV iron to be administered upon insurance approval. Will order a Fecal Occult to rule out GI bleeding. Will repeat labs 6-8 weeks post infusion.            Plan:  1 cycle IV iron (ordered Feraheme, will change to Venofer if preferred by insurance). Tentative start date  . Standing orders in EMR. Nurse sent to " precert  2. Fecal Occult Blood   3. Labs prior to follow-up (CBC w/diff, CMP, Ferritin, Iron studies, B12)  4. Follow-up in 6-8 weeks post IV iron infusion           LUCIANA Zamora-CNP

## 2024-04-02 ENCOUNTER — PREP FOR PROCEDURE (OUTPATIENT)
Dept: PAIN MEDICINE | Facility: CLINIC | Age: 79
End: 2024-04-02
Payer: MEDICARE

## 2024-04-02 DIAGNOSIS — M54.17 LUMBOSACRAL NEURITIS: Primary | ICD-10-CM

## 2024-04-02 RX ORDER — LIDOCAINE HYDROCHLORIDE 10 MG/ML
10 INJECTION, SOLUTION EPIDURAL; INFILTRATION; INTRACAUDAL; PERINEURAL ONCE
Status: CANCELLED | OUTPATIENT
Start: 2024-04-05 | End: 2024-04-02

## 2024-04-02 RX ORDER — LIDOCAINE HYDROCHLORIDE 5 MG/ML
2 INJECTION, SOLUTION INFILTRATION; INTRAVENOUS ONCE
Status: CANCELLED | OUTPATIENT
Start: 2024-04-05 | End: 2024-04-02

## 2024-04-02 RX ORDER — DEXAMETHASONE SODIUM PHOSPHATE 10 MG/ML
10 INJECTION INTRAMUSCULAR; INTRAVENOUS ONCE
Status: CANCELLED | OUTPATIENT
Start: 2024-04-05 | End: 2024-04-02

## 2024-04-05 ENCOUNTER — HOSPITAL ENCOUNTER (OUTPATIENT)
Dept: OPERATING ROOM | Facility: HOSPITAL | Age: 79
Setting detail: OUTPATIENT SURGERY
Discharge: HOME | End: 2024-04-05
Payer: MEDICARE

## 2024-04-05 ENCOUNTER — HOSPITAL ENCOUNTER (OUTPATIENT)
Dept: RADIOLOGY | Facility: HOSPITAL | Age: 79
Setting detail: OUTPATIENT SURGERY
Discharge: HOME | End: 2024-04-05
Payer: MEDICARE

## 2024-04-05 VITALS
TEMPERATURE: 98.4 F | OXYGEN SATURATION: 100 % | WEIGHT: 121 LBS | BODY MASS INDEX: 20.16 KG/M2 | HEIGHT: 65 IN | RESPIRATION RATE: 20 BRPM | SYSTOLIC BLOOD PRESSURE: 146 MMHG | DIASTOLIC BLOOD PRESSURE: 104 MMHG | HEART RATE: 74 BPM

## 2024-04-05 DIAGNOSIS — M54.17 LUMBOSACRAL NEURITIS: ICD-10-CM

## 2024-04-05 PROCEDURE — 64494 INJ PARAVERT F JNT L/S 2 LEV: CPT | Mod: RT

## 2024-04-05 PROCEDURE — 2500000005 HC RX 250 GENERAL PHARMACY W/O HCPCS: Performed by: STUDENT IN AN ORGANIZED HEALTH CARE EDUCATION/TRAINING PROGRAM

## 2024-04-05 PROCEDURE — 2500000005 HC RX 250 GENERAL PHARMACY W/O HCPCS

## 2024-04-05 PROCEDURE — 2550000001 HC RX 255 CONTRASTS: Performed by: PHYSICIAN ASSISTANT

## 2024-04-05 PROCEDURE — 2500000004 HC RX 250 GENERAL PHARMACY W/ HCPCS (ALT 636 FOR OP/ED): Performed by: STUDENT IN AN ORGANIZED HEALTH CARE EDUCATION/TRAINING PROGRAM

## 2024-04-05 PROCEDURE — 64493 INJ PARAVERT F JNT L/S 1 LEV: CPT | Mod: RT

## 2024-04-05 RX ORDER — LIDOCAINE HYDROCHLORIDE 10 MG/ML
10 INJECTION, SOLUTION EPIDURAL; INFILTRATION; INTRACAUDAL; PERINEURAL ONCE
Status: DISCONTINUED | OUTPATIENT
Start: 2024-04-05 | End: 2024-04-06 | Stop reason: HOSPADM

## 2024-04-05 RX ORDER — DEXAMETHASONE SODIUM PHOSPHATE 10 MG/ML
10 INJECTION INTRAMUSCULAR; INTRAVENOUS ONCE
Status: DISCONTINUED | OUTPATIENT
Start: 2024-04-05 | End: 2024-04-06 | Stop reason: HOSPADM

## 2024-04-05 RX ORDER — DEXAMETHASONE SODIUM PHOSPHATE 100 MG/10ML
INJECTION INTRAMUSCULAR; INTRAVENOUS AS NEEDED
Status: COMPLETED | OUTPATIENT
Start: 2024-04-05 | End: 2024-04-05

## 2024-04-05 RX ORDER — LIDOCAINE HYDROCHLORIDE 5 MG/ML
INJECTION, SOLUTION INFILTRATION; PERINEURAL AS NEEDED
Status: COMPLETED | OUTPATIENT
Start: 2024-04-05 | End: 2024-04-05

## 2024-04-05 RX ORDER — LIDOCAINE HYDROCHLORIDE 5 MG/ML
2 INJECTION, SOLUTION INFILTRATION; INTRAVENOUS ONCE
Status: DISCONTINUED | OUTPATIENT
Start: 2024-04-05 | End: 2024-04-06 | Stop reason: HOSPADM

## 2024-04-05 RX ORDER — LIDOCAINE HYDROCHLORIDE 10 MG/ML
INJECTION, SOLUTION EPIDURAL; INFILTRATION; INTRACAUDAL; PERINEURAL AS NEEDED
Status: COMPLETED | OUTPATIENT
Start: 2024-04-05 | End: 2024-04-05

## 2024-04-05 RX ADMIN — IOHEXOL 3 ML: 300 INJECTION, SOLUTION INTRAVENOUS at 12:05

## 2024-04-05 RX ADMIN — LIDOCAINE HYDROCHLORIDE 2 ML: 5 INJECTION, SOLUTION INFILTRATION; PERINEURAL at 12:06

## 2024-04-05 RX ADMIN — LIDOCAINE HYDROCHLORIDE 5 ML: 10 INJECTION, SOLUTION EPIDURAL; INFILTRATION; INTRACAUDAL; PERINEURAL at 12:06

## 2024-04-05 RX ADMIN — DEXAMETHASONE SODIUM PHOSPHATE 10 MG: 10 INJECTION INTRAMUSCULAR; INTRAVENOUS at 12:06

## 2024-04-05 ASSESSMENT — PAIN - FUNCTIONAL ASSESSMENT
PAIN_FUNCTIONAL_ASSESSMENT: 0-10
PAIN_FUNCTIONAL_ASSESSMENT: 0-10

## 2024-04-05 ASSESSMENT — COLUMBIA-SUICIDE SEVERITY RATING SCALE - C-SSRS
2. HAVE YOU ACTUALLY HAD ANY THOUGHTS OF KILLING YOURSELF?: NO
6. HAVE YOU EVER DONE ANYTHING, STARTED TO DO ANYTHING, OR PREPARED TO DO ANYTHING TO END YOUR LIFE?: NO
1. IN THE PAST MONTH, HAVE YOU WISHED YOU WERE DEAD OR WISHED YOU COULD GO TO SLEEP AND NOT WAKE UP?: NO

## 2024-04-05 ASSESSMENT — PAIN DESCRIPTION - DESCRIPTORS: DESCRIPTORS: ACHING

## 2024-04-05 ASSESSMENT — PAIN SCALES - GENERAL
PAINLEVEL_OUTOF10: 5 - MODERATE PAIN
PAINLEVEL_OUTOF10: 3

## 2024-04-05 NOTE — OP NOTE
* No procedures listed * Operative Note     Date: 2024  OR Location: [place] [address] OR    Name: Olayinka Melara, : 1945, Age: 78 y.o., MRN: [id number], Sex: male    Diagnosis  * No Diagnosis Codes entered * * No Diagnosis Codes entered *     Procedures  * No procedures documented on diagnosis form *    Surgeons   * No surgeons found in log *    Resident/Fellow/Other Assistant:  * No surgeons found in log *    Procedure Summary  Anesthesia: * No anesthesia type entered *  ASA: ASA status not filed in the log.  Anesthesia Staff: No anesthesia staff entered.  Estimated Blood Loss: 0mL  Intra-op Medications: * Intraprocedure medication information is unavailable because the case start and end events have not been set *      Intraprocedure I/O Totals       None           Specimen: No specimens collected     Staff:   Circulator: Roman Foy RN; Jeny Ruiz RN  Scrub Person: Arlen Gillis RN; Cate Fitch         Drains and/or Catheters: * None in log *    Tourniquet Times:         Implants:     Findings: non    Indications: Olayinka Melara is an 78 y.o. male who is having surgery for * No pre-op diagnosis entered *. M54.17 lumbosacral radiculopathy    The patient was seen in the preoperative area. The risks, benefits, complications, treatment options, non-operative alternatives, expected recovery and outcomes were discussed with the patient. The possibilities of reaction to medication, pulmonary aspiration, injury to surrounding structures, bleeding, recurrent infection, the need for additional procedures, failure to diagnose a condition, and creating a complication requiring transfusion or operation were discussed with the patient. The patient concurred with the proposed plan, giving informed consent.  The site of surgery was properly noted/marked if necessary per policy. The patient has been actively warmed in preoperative area. Preoperative antibiotics are not indicated. Venous thrombosis  prophylaxis are not indicated.    Procedure Details: right L3/4 and L4/5 lumbar transforaminal epidural steroid injection under fluoroscopic guidance  Complications:  None; patient tolerated the procedure well.    Disposition:  home  Condition: stable         Additional Details: After informed consent was obtained, the patient was brought to the procedure suite and placed in the prone position.  Pulse oximetry and blood pressure were monitored throughout.  The low back area was prepped and draped in the usual sterile fashion.  Using fluoroscopic guidance, the skin and subcutaneous tissue overlying the needle trajectory of the neuroforamina were anesthetized with 0.5% lidocaine.  The 22-gauge Mary Jane needles were then advanced under fluoroscopic guidance into the foramina.  Needle tip positions were confirmed in at least two views.  Injection of contrast revealed appropriate spread of the dye without vascular uptake.  Next, at each site, 1 mL of 0.5% lidocaine and 10 mg dexamethasone were injected in divided doses through each needle tip.  The needles were removed and the patient was then transferred to the recovery room in stable condition.  The patient tolerated the procedure well.  There were no apparent complications.     FOLLOW UP:  The patient will update us on their response to this procedure, and agrees to continue currently prescribed/recommended therapies.      Attending Attestation:     *No primary surgeon found*

## 2024-04-05 NOTE — PERIOPERATIVE NURSING NOTE
Ambulated to discharge exit; steady gait with walker; Driven home by wife, Victorina. Home oxygen on 2L/NC.

## 2024-04-05 NOTE — Clinical Note
Pt arrived to OR on cart. Transferred to OR table. Safety strap applied. Pt padded with pillows in prone  position. Pt prepped with Chloraprep with a fire risk of 1. Bandaids applied to injection sites. Pt. Transferred to ACS on cart. Report given to Nidia Goel RN

## 2024-04-05 NOTE — OP NOTE
* No procedures listed * Operative Note     Date: 2024  OR Location: [place] [address] OR    Name: Olayinka Melara, : 1945, Age: 78 y.o., MRN: [id number], Sex: male    Diagnosis  * No Diagnosis Codes entered * * No Diagnosis Codes entered *     Procedures  * No procedures documented on diagnosis form *    Surgeons   * No surgeons found in log *    Resident/Fellow/Other Assistant:  * No surgeons found in log *    Procedure Summary  Anesthesia: * No anesthesia type entered *  ASA: ASA status not filed in the log.  Anesthesia Staff: No anesthesia staff entered.  Estimated Blood Loss: 0mL  Intra-op Medications: * Intraprocedure medication information is unavailable because the case start and end events have not been set *      Intraprocedure I/O Totals       None           Specimen: No specimens collected     Staff:   No surgical staff documented.         Drains and/or Catheters: * None in log *    Tourniquet Times:         Implants:     Findings: none    Indications: Olayinka Melara is an 78 y.o. male who is having surgery for * No pre-op diagnosis entered *. M47.817 lumbosacral spondyloarthropathy    The patient was seen in the preoperative area. The risks, benefits, complications, treatment options, non-operative alternatives, expected recovery and outcomes were discussed with the patient. The possibilities of reaction to medication, pulmonary aspiration, injury to surrounding structures, bleeding, recurrent infection, the need for additional procedures, failure to diagnose a condition, and creating a complication requiring transfusion or operation were discussed with the patient. The patient concurred with the proposed plan, giving informed consent.  The site of surgery was properly noted/marked if necessary per policy. The patient has been actively warmed in preoperative area. Preoperative antibiotics are not indicated. Venous thrombosis prophylaxis are not indicated.    Procedure Details: bilateral L3 and  L4 medial branch and L5 posterior ramus nerve blocks to target the L4/5 and L5/S1 facet joints under fluoroscopic guidance  Complications:  None; patient tolerated the procedure well.    Disposition:  home  Condition: stable         Additional Details: We used gadolinium based contrast due to patient allergy to iodine based contrast.    After informed consent was obtained, the patient was brought to the procedure room and placed in the prone position.  The back area was prepped and draped in the usual sterile fashion.  Using fluoroscopic guidance, skin and subcutaneous tissue overlying the needle trajectories to the target sites were anesthetized with 2.0% lidocaine.  23-gauge spinal needles were advanced under fluoroscopic guidance to the appropriate anatomic landmarks.  Needle tip position was confirmed in AP and oblique views initially on the right side, then on the left side.  Injection of small amount of contrast at each needle tip revealed appropriate spread without vascular uptake.  Subsequently, 0.5 mL of 0.5% bupivacaine was injected at each needle tip.  The needles were removed.  The patient was then transferred to the recovery room in stable condition.  The patient will be assessed in recovery area for pain relief.       Attending Attestation: I was present and scrubbed for the entire procedure.    *No primary surgeon found*

## 2024-04-10 ENCOUNTER — INFUSION (OUTPATIENT)
Dept: HEMATOLOGY/ONCOLOGY | Facility: CLINIC | Age: 79
End: 2024-04-10
Payer: MEDICARE

## 2024-04-10 VITALS
BODY MASS INDEX: 19.92 KG/M2 | SYSTOLIC BLOOD PRESSURE: 127 MMHG | RESPIRATION RATE: 22 BRPM | WEIGHT: 121.03 LBS | TEMPERATURE: 97.9 F | OXYGEN SATURATION: 96 % | DIASTOLIC BLOOD PRESSURE: 69 MMHG | HEART RATE: 73 BPM

## 2024-04-10 DIAGNOSIS — D63.8 ANEMIA OF CHRONIC DISEASE: ICD-10-CM

## 2024-04-10 DIAGNOSIS — E61.1 IRON DEFICIENCY: ICD-10-CM

## 2024-04-10 PROCEDURE — 2500000004 HC RX 250 GENERAL PHARMACY W/ HCPCS (ALT 636 FOR OP/ED): Mod: JZ

## 2024-04-10 PROCEDURE — 96374 THER/PROPH/DIAG INJ IV PUSH: CPT

## 2024-04-10 RX ORDER — HEPARIN SODIUM,PORCINE/PF 10 UNIT/ML
50 SYRINGE (ML) INTRAVENOUS AS NEEDED
Status: CANCELLED | OUTPATIENT
Start: 2024-04-10

## 2024-04-10 RX ORDER — ALBUTEROL SULFATE 0.83 MG/ML
3 SOLUTION RESPIRATORY (INHALATION) AS NEEDED
Status: CANCELLED | OUTPATIENT
Start: 2024-04-17

## 2024-04-10 RX ORDER — FAMOTIDINE 10 MG/ML
20 INJECTION INTRAVENOUS ONCE AS NEEDED
Status: CANCELLED | OUTPATIENT
Start: 2024-04-17

## 2024-04-10 RX ORDER — EPINEPHRINE 0.3 MG/.3ML
0.3 INJECTION SUBCUTANEOUS EVERY 5 MIN PRN
Status: CANCELLED | OUTPATIENT
Start: 2024-04-17

## 2024-04-10 RX ORDER — HEPARIN 100 UNIT/ML
500 SYRINGE INTRAVENOUS AS NEEDED
Status: CANCELLED | OUTPATIENT
Start: 2024-04-10

## 2024-04-10 RX ORDER — DIPHENHYDRAMINE HYDROCHLORIDE 50 MG/ML
50 INJECTION INTRAMUSCULAR; INTRAVENOUS AS NEEDED
Status: CANCELLED | OUTPATIENT
Start: 2024-04-17

## 2024-04-10 RX ADMIN — FERUMOXYTOL 510 MG: 510 INJECTION INTRAVENOUS at 09:45

## 2024-04-10 ASSESSMENT — PATIENT HEALTH QUESTIONNAIRE - PHQ9
2. FEELING DOWN, DEPRESSED OR HOPELESS: NOT AT ALL
SUM OF ALL RESPONSES TO PHQ9 QUESTIONS 1 AND 2: 0
1. LITTLE INTEREST OR PLEASURE IN DOING THINGS: NOT AT ALL

## 2024-04-10 ASSESSMENT — ENCOUNTER SYMPTOMS
LOSS OF SENSATION IN FEET: 0
DEPRESSION: 0
OCCASIONAL FEELINGS OF UNSTEADINESS: 1

## 2024-04-10 ASSESSMENT — COLUMBIA-SUICIDE SEVERITY RATING SCALE - C-SSRS
1. IN THE PAST MONTH, HAVE YOU WISHED YOU WERE DEAD OR WISHED YOU COULD GO TO SLEEP AND NOT WAKE UP?: NO
2. HAVE YOU ACTUALLY HAD ANY THOUGHTS OF KILLING YOURSELF?: NO
6. HAVE YOU EVER DONE ANYTHING, STARTED TO DO ANYTHING, OR PREPARED TO DO ANYTHING TO END YOUR LIFE?: NO

## 2024-04-10 ASSESSMENT — PAIN SCALES - GENERAL: PAINLEVEL: 0-NO PAIN

## 2024-04-17 ENCOUNTER — INFUSION (OUTPATIENT)
Dept: HEMATOLOGY/ONCOLOGY | Facility: CLINIC | Age: 79
End: 2024-04-17
Payer: MEDICARE

## 2024-04-17 VITALS
HEART RATE: 70 BPM | BODY MASS INDEX: 19.71 KG/M2 | SYSTOLIC BLOOD PRESSURE: 116 MMHG | WEIGHT: 119.71 LBS | OXYGEN SATURATION: 97 % | TEMPERATURE: 97.9 F | DIASTOLIC BLOOD PRESSURE: 71 MMHG | RESPIRATION RATE: 20 BRPM

## 2024-04-17 DIAGNOSIS — D63.8 ANEMIA OF CHRONIC DISEASE: ICD-10-CM

## 2024-04-17 DIAGNOSIS — E61.1 IRON DEFICIENCY: ICD-10-CM

## 2024-04-17 PROCEDURE — 96365 THER/PROPH/DIAG IV INF INIT: CPT

## 2024-04-17 PROCEDURE — 2500000004 HC RX 250 GENERAL PHARMACY W/ HCPCS (ALT 636 FOR OP/ED): Mod: JZ

## 2024-04-17 RX ORDER — DIPHENHYDRAMINE HYDROCHLORIDE 50 MG/ML
50 INJECTION INTRAMUSCULAR; INTRAVENOUS AS NEEDED
OUTPATIENT
Start: 2024-04-17

## 2024-04-17 RX ORDER — EPINEPHRINE 0.3 MG/.3ML
0.3 INJECTION SUBCUTANEOUS EVERY 5 MIN PRN
OUTPATIENT
Start: 2024-04-17

## 2024-04-17 RX ORDER — EPINEPHRINE 0.3 MG/.3ML
0.3 INJECTION SUBCUTANEOUS EVERY 5 MIN PRN
Status: CANCELLED | OUTPATIENT
Start: 2024-04-17

## 2024-04-17 RX ORDER — ALBUTEROL SULFATE 0.83 MG/ML
3 SOLUTION RESPIRATORY (INHALATION) AS NEEDED
Status: CANCELLED | OUTPATIENT
Start: 2024-04-17

## 2024-04-17 RX ORDER — DIPHENHYDRAMINE HYDROCHLORIDE 50 MG/ML
50 INJECTION INTRAMUSCULAR; INTRAVENOUS AS NEEDED
Status: CANCELLED | OUTPATIENT
Start: 2024-04-17

## 2024-04-17 RX ORDER — FAMOTIDINE 10 MG/ML
20 INJECTION INTRAVENOUS ONCE AS NEEDED
Status: CANCELLED | OUTPATIENT
Start: 2024-04-17

## 2024-04-17 RX ORDER — HEPARIN SODIUM,PORCINE/PF 10 UNIT/ML
50 SYRINGE (ML) INTRAVENOUS AS NEEDED
OUTPATIENT
Start: 2024-04-17

## 2024-04-17 RX ORDER — ALBUTEROL SULFATE 0.83 MG/ML
3 SOLUTION RESPIRATORY (INHALATION) AS NEEDED
OUTPATIENT
Start: 2024-04-17

## 2024-04-17 RX ORDER — HEPARIN 100 UNIT/ML
500 SYRINGE INTRAVENOUS AS NEEDED
OUTPATIENT
Start: 2024-04-17

## 2024-04-17 RX ORDER — FAMOTIDINE 10 MG/ML
20 INJECTION INTRAVENOUS ONCE AS NEEDED
OUTPATIENT
Start: 2024-04-17

## 2024-04-17 RX ADMIN — FERUMOXYTOL 510 MG: 510 INJECTION INTRAVENOUS at 09:18

## 2024-04-17 ASSESSMENT — PAIN SCALES - GENERAL: PAINLEVEL: 0-NO PAIN

## 2024-04-23 ENCOUNTER — TELEPHONE (OUTPATIENT)
Dept: PAIN MEDICINE | Facility: CLINIC | Age: 79
End: 2024-04-23
Payer: MEDICARE

## 2024-04-23 DIAGNOSIS — M54.12 CERVICAL RADICULITIS: ICD-10-CM

## 2024-04-23 DIAGNOSIS — G89.4 CHRONIC PAIN DISORDER: ICD-10-CM

## 2024-04-23 DIAGNOSIS — M48.062 NEUROGENIC CLAUDICATION DUE TO LUMBAR SPINAL STENOSIS: ICD-10-CM

## 2024-04-23 RX ORDER — TRAMADOL HYDROCHLORIDE 50 MG/1
50 TABLET ORAL 3 TIMES DAILY PRN
Qty: 90 TABLET | Refills: 0 | Status: SHIPPED | OUTPATIENT
Start: 2024-04-23 | End: 2024-05-28 | Stop reason: SDUPTHER

## 2024-05-06 ENCOUNTER — APPOINTMENT (OUTPATIENT)
Dept: PRIMARY CARE | Facility: CLINIC | Age: 79
End: 2024-05-06
Payer: MEDICARE

## 2024-05-28 ENCOUNTER — TELEPHONE (OUTPATIENT)
Dept: PAIN MEDICINE | Facility: CLINIC | Age: 79
End: 2024-05-28
Payer: MEDICARE

## 2024-05-28 DIAGNOSIS — G89.4 CHRONIC PAIN DISORDER: ICD-10-CM

## 2024-05-28 DIAGNOSIS — M54.12 CERVICAL RADICULITIS: ICD-10-CM

## 2024-05-28 DIAGNOSIS — M48.062 NEUROGENIC CLAUDICATION DUE TO LUMBAR SPINAL STENOSIS: ICD-10-CM

## 2024-05-28 RX ORDER — TRAMADOL HYDROCHLORIDE 50 MG/1
50 TABLET ORAL 3 TIMES DAILY PRN
Qty: 90 TABLET | Refills: 0 | Status: SHIPPED | OUTPATIENT
Start: 2024-05-28

## 2024-05-31 ENCOUNTER — LAB (OUTPATIENT)
Dept: LAB | Facility: LAB | Age: 79
End: 2024-05-31
Payer: MEDICARE

## 2024-05-31 ENCOUNTER — OFFICE VISIT (OUTPATIENT)
Dept: PRIMARY CARE | Facility: CLINIC | Age: 79
End: 2024-05-31
Payer: MEDICARE

## 2024-05-31 VITALS
SYSTOLIC BLOOD PRESSURE: 110 MMHG | WEIGHT: 117.1 LBS | OXYGEN SATURATION: 96 % | BODY MASS INDEX: 19.51 KG/M2 | DIASTOLIC BLOOD PRESSURE: 56 MMHG | HEIGHT: 65 IN | HEART RATE: 97 BPM

## 2024-05-31 DIAGNOSIS — R53.83 FATIGUE, UNSPECIFIED TYPE: ICD-10-CM

## 2024-05-31 DIAGNOSIS — R35.1 NOCTURIA: ICD-10-CM

## 2024-05-31 DIAGNOSIS — Z00.00 ROUTINE GENERAL MEDICAL EXAMINATION AT HEALTH CARE FACILITY: Primary | ICD-10-CM

## 2024-05-31 LAB
MAGNESIUM SERPL-MCNC: 2.13 MG/DL (ref 1.6–2.4)
PSA SERPL-MCNC: 0.73 NG/ML
TSH SERPL-ACNC: 0.35 MIU/L (ref 0.44–3.98)

## 2024-05-31 PROCEDURE — 1170F FXNL STATUS ASSESSED: CPT | Performed by: FAMILY MEDICINE

## 2024-05-31 PROCEDURE — 84443 ASSAY THYROID STIM HORMONE: CPT

## 2024-05-31 PROCEDURE — 1157F ADVNC CARE PLAN IN RCRD: CPT | Performed by: FAMILY MEDICINE

## 2024-05-31 PROCEDURE — 1036F TOBACCO NON-USER: CPT | Performed by: FAMILY MEDICINE

## 2024-05-31 PROCEDURE — 1159F MED LIST DOCD IN RCRD: CPT | Performed by: FAMILY MEDICINE

## 2024-05-31 PROCEDURE — 83735 ASSAY OF MAGNESIUM: CPT

## 2024-05-31 PROCEDURE — 99213 OFFICE O/P EST LOW 20 MIN: CPT | Performed by: FAMILY MEDICINE

## 2024-05-31 PROCEDURE — 1160F RVW MEDS BY RX/DR IN RCRD: CPT | Performed by: FAMILY MEDICINE

## 2024-05-31 PROCEDURE — G0439 PPPS, SUBSEQ VISIT: HCPCS | Performed by: FAMILY MEDICINE

## 2024-05-31 PROCEDURE — 84153 ASSAY OF PSA TOTAL: CPT

## 2024-05-31 PROCEDURE — 36415 COLL VENOUS BLD VENIPUNCTURE: CPT

## 2024-05-31 ASSESSMENT — ACTIVITIES OF DAILY LIVING (ADL)
DRESSING: INDEPENDENT
MANAGING_FINANCES: TOTAL CARE
TAKING_MEDICATION: NEEDS ASSISTANCE
GROCERY_SHOPPING: TOTAL CARE
BATHING: INDEPENDENT
DOING_HOUSEWORK: NEEDS ASSISTANCE

## 2024-05-31 ASSESSMENT — ENCOUNTER SYMPTOMS
FREQUENCY: 1
SHORTNESS OF BREATH: 1
HEMATURIA: 0
OCCASIONAL FEELINGS OF UNSTEADINESS: 0
PALPITATIONS: 0
LOSS OF SENSATION IN FEET: 0
COUGH: 1
HEADACHES: 0
FATIGUE: 1
DIFFICULTY URINATING: 1
DEPRESSION: 0

## 2024-05-31 NOTE — PROGRESS NOTES
"Subjective   Reason for Visit: Olayinka Melara is an 78 y.o. male here for a Medicare Wellness visit.     Past Medical, Surgical, and Family History reviewed and updated in chart.    Reviewed all medications by prescribing practitioner or clinical pharmacist (such as prescriptions, OTCs, herbal therapies and supplements) and documented in the medical record.    HPI  Breathing poor but stable.  [facility]  RSV shot done.    Needs new tubing for his oxygen.  Need to send to [facility] pharmacy.    Colonoscopy and EGD done 2022.  2 polyps found.  No major symptom change, but will add PSA.  He is feeling extra tired we will check a TSH and a magnesium level.  Other labs are being followed by hematology.    Patient Care Team:  Amrit Aaron MD as PCP - General  KIARA Zamora as PCP - [employer] PCP     Review of Systems   Constitutional:  Positive for fatigue.   Respiratory:  Positive for cough and shortness of breath.    Cardiovascular:  Negative for chest pain and palpitations.   Genitourinary:  Positive for difficulty urinating and frequency. Negative for hematuria.   Neurological:  Negative for headaches.       Objective   Vitals:  /56   Pulse 97   Ht 1.651 m (5' 5\")   Wt 53.1 kg (117 lb 1.6 oz)   SpO2 96%   BMI 19.49 kg/m²       Physical Exam  Constitutional:       Appearance: Normal appearance.   HENT:      Head: Normocephalic and atraumatic.   Cardiovascular:      Rate and Rhythm: Normal rate and regular rhythm.      Heart sounds: Normal heart sounds.   Pulmonary:      Effort: Pulmonary effort is normal.      Breath sounds: Normal breath sounds. Decreased air movement present.   Skin:     General: Skin is warm and dry.   Neurological:      General: No focal deficit present.      Mental Status: He is alert and oriented to person, place, and time.   Psychiatric:         Mood and Affect: Mood normal.         Behavior: Behavior normal.         Thought " Content: Thought content normal.         Judgment: Judgment normal.         Assessment/Plan   Problem List Items Addressed This Visit    None  Visit Diagnoses       Routine general medical examination at health care facility    -  Primary    Fatigue, unspecified type        Relevant Orders    Thyroid Stimulating Hormone    Magnesium    Nocturia        Relevant Orders    Prostate Specific Antigen

## 2024-06-03 ENCOUNTER — APPOINTMENT (OUTPATIENT)
Dept: HEMATOLOGY/ONCOLOGY | Facility: CLINIC | Age: 79
End: 2024-06-03
Payer: MEDICARE

## 2024-06-05 ENCOUNTER — LAB (OUTPATIENT)
Dept: LAB | Facility: LAB | Age: 79
End: 2024-06-05
Payer: MEDICARE

## 2024-06-05 DIAGNOSIS — D63.8 ANEMIA OF CHRONIC DISEASE: ICD-10-CM

## 2024-06-05 DIAGNOSIS — E61.1 IRON DEFICIENCY: ICD-10-CM

## 2024-06-05 LAB
ALBUMIN SERPL BCP-MCNC: 3.7 G/DL (ref 3.4–5)
ALP SERPL-CCNC: 71 U/L (ref 33–136)
ALT SERPL W P-5'-P-CCNC: 5 U/L (ref 10–52)
ANION GAP SERPL CALC-SCNC: 11 MMOL/L (ref 10–20)
AST SERPL W P-5'-P-CCNC: 11 U/L (ref 9–39)
BASOPHILS # BLD AUTO: 0.04 X10*3/UL (ref 0–0.1)
BASOPHILS NFR BLD AUTO: 1.1 %
BILIRUB SERPL-MCNC: 1 MG/DL (ref 0–1.2)
BUN SERPL-MCNC: 11 MG/DL (ref 6–23)
CALCIUM SERPL-MCNC: 8.8 MG/DL (ref 8.6–10.3)
CHLORIDE SERPL-SCNC: 99 MMOL/L (ref 98–107)
CO2 SERPL-SCNC: 28 MMOL/L (ref 21–32)
CREAT SERPL-MCNC: 0.72 MG/DL (ref 0.5–1.3)
EGFRCR SERPLBLD CKD-EPI 2021: >90 ML/MIN/1.73M*2
EOSINOPHIL # BLD AUTO: 0.07 X10*3/UL (ref 0–0.4)
EOSINOPHIL NFR BLD AUTO: 1.9 %
ERYTHROCYTE [DISTWIDTH] IN BLOOD BY AUTOMATED COUNT: 20.1 % (ref 11.5–14.5)
FERRITIN SERPL-MCNC: 249 NG/ML (ref 20–300)
GLUCOSE SERPL-MCNC: 94 MG/DL (ref 74–99)
HCT VFR BLD AUTO: 34.8 % (ref 41–52)
HGB BLD-MCNC: 10.5 G/DL (ref 13.5–17.5)
IMM GRANULOCYTES # BLD AUTO: 0.03 X10*3/UL (ref 0–0.5)
IMM GRANULOCYTES NFR BLD AUTO: 0.8 % (ref 0–0.9)
IRON SATN MFR SERPL: 23 % (ref 25–45)
IRON SERPL-MCNC: 55 UG/DL (ref 35–150)
LYMPHOCYTES # BLD AUTO: 0.95 X10*3/UL (ref 0.8–3)
LYMPHOCYTES NFR BLD AUTO: 25.3 %
MCH RBC QN AUTO: 28.1 PG (ref 26–34)
MCHC RBC AUTO-ENTMCNC: 30.2 G/DL (ref 32–36)
MCV RBC AUTO: 93 FL (ref 80–100)
MONOCYTES # BLD AUTO: 0.32 X10*3/UL (ref 0.05–0.8)
MONOCYTES NFR BLD AUTO: 8.5 %
NEUTROPHILS # BLD AUTO: 2.34 X10*3/UL (ref 1.6–5.5)
NEUTROPHILS NFR BLD AUTO: 62.4 %
NRBC BLD-RTO: 0 /100 WBCS (ref 0–0)
PLATELET # BLD AUTO: 340 X10*3/UL (ref 150–450)
POTASSIUM SERPL-SCNC: 4.8 MMOL/L (ref 3.5–5.3)
PROT SERPL-MCNC: 6.1 G/DL (ref 6.4–8.2)
RBC # BLD AUTO: 3.74 X10*6/UL (ref 4.5–5.9)
SODIUM SERPL-SCNC: 133 MMOL/L (ref 136–145)
TIBC SERPL-MCNC: 243 UG/DL (ref 240–445)
UIBC SERPL-MCNC: 188 UG/DL (ref 110–370)
VIT B12 SERPL-MCNC: 587 PG/ML (ref 211–911)
WBC # BLD AUTO: 3.8 X10*3/UL (ref 4.4–11.3)

## 2024-06-05 PROCEDURE — 82728 ASSAY OF FERRITIN: CPT

## 2024-06-05 PROCEDURE — 83540 ASSAY OF IRON: CPT

## 2024-06-05 PROCEDURE — 83550 IRON BINDING TEST: CPT

## 2024-06-05 PROCEDURE — 85025 COMPLETE CBC W/AUTO DIFF WBC: CPT

## 2024-06-05 PROCEDURE — 36415 COLL VENOUS BLD VENIPUNCTURE: CPT

## 2024-06-05 PROCEDURE — 80053 COMPREHEN METABOLIC PANEL: CPT

## 2024-06-05 PROCEDURE — 82607 VITAMIN B-12: CPT

## 2024-06-10 ENCOUNTER — OFFICE VISIT (OUTPATIENT)
Dept: HEMATOLOGY/ONCOLOGY | Facility: CLINIC | Age: 79
End: 2024-06-10
Payer: MEDICARE

## 2024-06-10 VITALS
OXYGEN SATURATION: 99 % | RESPIRATION RATE: 24 BRPM | SYSTOLIC BLOOD PRESSURE: 104 MMHG | TEMPERATURE: 96.7 F | BODY MASS INDEX: 19.67 KG/M2 | DIASTOLIC BLOOD PRESSURE: 66 MMHG | WEIGHT: 118.2 LBS | HEART RATE: 62 BPM

## 2024-06-10 DIAGNOSIS — E61.1 IRON DEFICIENCY: ICD-10-CM

## 2024-06-10 DIAGNOSIS — D63.8 ANEMIA OF CHRONIC DISEASE: ICD-10-CM

## 2024-06-10 PROCEDURE — 1157F ADVNC CARE PLAN IN RCRD: CPT

## 2024-06-10 PROCEDURE — 99213 OFFICE O/P EST LOW 20 MIN: CPT

## 2024-06-10 PROCEDURE — 1036F TOBACCO NON-USER: CPT

## 2024-06-10 PROCEDURE — 1159F MED LIST DOCD IN RCRD: CPT

## 2024-06-10 PROCEDURE — 1126F AMNT PAIN NOTED NONE PRSNT: CPT

## 2024-06-10 ASSESSMENT — PATIENT HEALTH QUESTIONNAIRE - PHQ9
SUM OF ALL RESPONSES TO PHQ9 QUESTIONS 1 AND 2: 0
1. LITTLE INTEREST OR PLEASURE IN DOING THINGS: NOT AT ALL
2. FEELING DOWN, DEPRESSED OR HOPELESS: NOT AT ALL

## 2024-06-10 ASSESSMENT — PAIN SCALES - GENERAL: PAINLEVEL: 0-NO PAIN

## 2024-06-10 NOTE — PROGRESS NOTES
Instructed wife - for pt to get labs at lease 1 week ahead of appt  Rtc 9/9 at 930 for CNP visit  Reviewed AVS with patient- patient verbalizes understanding

## 2024-06-10 NOTE — PROGRESS NOTES
Patient ID: Olayinka Mleara is a 78 y.o. male.    Subjective    HPI  Chief Complaint: Evaluation for anemia   Interval History:    PCP : Dr. Aaron     Reason for referral: Anemia     HPI  75 year old man with pmh of BPH, COPD, kyphoscoliosis, Lumbosacral spondylosis, referred for anemia. He has chronic anemia and progressively dropping Hg 11-12 g/dl last year to 8.6 g/dl in Sep 2021 until recently he had a visit to ER on 10/22/21 for SoB where his Hg was 7.0 g/dl. MCV is 64, his ferritin was 10 , b12 and ferritin were normal last year. He was transfused 1 unit PRBC and told to follow hematology  He has been feeling more tired recently , he has chronic fatigue that worsened near the time he went to the emergency room   He has chronic SOB and DUARTE from COPD and actually was having some hemoptysis with blood tinged sputum that day . This has improved   He has occasionally seeing melena around once per month   He has chronic  epigastric pain and occasional nausea   He is not active and does not have energy to do much activities   Weight fluctuating but mostly same range of 112-118   He is no urinary complaints   No changes in hair or nails      EGD on 1/21/22 showed erosive gastropathy with stigmata of bleeding, biopsies showed healing ulcers , no malignancy      interval history - 6/10/24     Pulmonary rehab 3 times weekly the past two weeks, feels like it is helping  Legs don't feel so tired     Energy is poor, fatigues easily   Possibly change of the weather   Eating and drinking okay   Bruises easily   No abnormal bleeding events     DUARTE stairs remains on 2.5L -3L NC O2  Pulse ox  ranges from 99%  No chest pain or pressure   No fever, chills, or infections   Occasional night sweats 2-3 times weekly     Chronic Productive cough, clear thick phlegm     No abdominal pain /cramping   No diarrhea or constipation   Frequent urination, no dysuria or hematuria     Occasional numbness or tingling in hands or feet  No new  concerns    Objective    BSA: 1.57 meters squared  /66 (BP Location: Right arm, Patient Position: Sitting, BP Cuff Size: Adult)   Pulse 62   Temp 35.9 °C (96.7 °F) (Skin)   Resp 24   Wt 53.6 kg (118 lb 3.2 oz)   SpO2 99% Comment: on 3L of O2  BMI 19.67 kg/m²      Physical Exam  Vitals and nursing note reviewed.   Constitutional:       Appearance: Normal appearance.   HENT:      Head: Normocephalic and atraumatic.      Mouth/Throat:      Pharynx: Oropharynx is clear.   Eyes:      General: No scleral icterus.     Extraocular Movements: Extraocular movements intact.      Conjunctiva/sclera: Conjunctivae normal.   Cardiovascular:      Rate and Rhythm: Normal rate and regular rhythm.      Pulses: Normal pulses.      Heart sounds: Normal heart sounds.   Pulmonary:      Breath sounds: Wheezing present.   Abdominal:      Palpations: Abdomen is soft.      Tenderness: There is no abdominal tenderness.   Musculoskeletal:         General: Normal range of motion.      Cervical back: Normal range of motion.   Skin:     General: Skin is warm and dry.      Findings: Bruising present.   Neurological:      General: No focal deficit present.      Mental Status: He is alert and oriented to person, place, and time.      Motor: Weakness present.   Psychiatric:         Mood and Affect: Mood normal.         Behavior: Behavior normal.         Thought Content: Thought content normal.         Judgment: Judgment normal.         Performance Status:  Symptomatic; fully ambulatory      Assessment/Plan        1. Anemia   secondary to iron deficiency on a likely AOCD  s/p one unit PRBC transfusion in ER in Oct 2021      have received 2 course of IV iron   improved Hg now to 11.4 g/dl and sufficient iron stores      remaining labs normal/ negative      EGD showed evidence of upper GI bleed with erosive gastropathy/ ulcer      11/29/22- His energy remains low and he feels weak. His breathing is stable, remains on 2L oxygen. Denies any  "abnormal bleeding or bruising issues. Is taking PO iron daily, and tolerating well.      He is slightly more anemic and iron deficient. Hgb (10.7), Iron Serum (70), Ferritin (34), TIBC (319), Tsat (22%), Vitamin B12 (527). Discussed with patient and his wife doing two doses of Venofer and will repeat labs in 8 weeks after second dose of Venofer.     10/12/23:  Energy continues to be low. Breathing is stable, home oxygen 2L nasal canula. No major bleeding events, patient does bruise easy.  No constitutional \"B\" symptoms reported.       He remains mildly anemic with improvement Hgb of 11.0g/dl.  Iron parameters are once again deficient. Fe 29, T sat 8%, Ferritin 29, B12 721.  Discussed ordering IV iron for iron replacement. Will continue to monitor labs closely.         6/10/24: Patient is set to complete pulmonary rehab this week, feels as though it has helped some. He remains to feel fatigued, at times legs feel weak. Ambulates with a cane. Denies any recent falls. DUARTE remains unchanged, mainly with walking and stairs. Continue on continuous home oxygen ranging from 2.5-3.0 L [place] depending on his activity. Energy varies from day day to day. Bruises easily, denies any abnormal bleeding. No GI or Urinary issues.  Eating and drinking well. No constitutional \"B\" symptoms.     Reviewed labs: Hg 10.5, WBC 3.8, Plts 340k  Fe 55, Fe sat 23%, Ferritin 249, B12 587     Patient remains anemic with a Hg of 10.5, but stable, Discussed likely secondary to chronic disease, and inflammatory processes. Iron parameters are stable, will restart oral iron every other day. WBC has slightly trended down to 3.8, will continue to monitor at this time, patient denies any constitutional \"B\" symptoms.           Plan:  Start oral iron every other day  2.. Labs prior to follow-up (CBC w/diff, CMP, Ferritin, Iron studies, B12)  3. Follow-up in 3 months        Rola Luciano, LUCIANA-CNP           "

## 2024-06-10 NOTE — PATIENT INSTRUCTIONS
Labs remain stable  Restart oral iron every other day   RTC in 3 months with labs prior   (CBC W/diff, CMP, Ferritin, Iron Studies, B12, SPEP, Immunoglobulin, FLC)

## 2024-06-24 ENCOUNTER — TELEPHONE (OUTPATIENT)
Dept: PAIN MEDICINE | Facility: CLINIC | Age: 79
End: 2024-06-24
Payer: MEDICARE

## 2024-06-24 DIAGNOSIS — M54.12 CERVICAL RADICULITIS: ICD-10-CM

## 2024-06-24 DIAGNOSIS — G89.4 CHRONIC PAIN DISORDER: ICD-10-CM

## 2024-06-24 DIAGNOSIS — M48.062 NEUROGENIC CLAUDICATION DUE TO LUMBAR SPINAL STENOSIS: ICD-10-CM

## 2024-06-25 RX ORDER — TRAMADOL HYDROCHLORIDE 50 MG/1
50 TABLET ORAL 3 TIMES DAILY PRN
Qty: 90 TABLET | Refills: 0 | Status: SHIPPED | OUTPATIENT
Start: 2024-06-25

## 2024-07-29 ENCOUNTER — TELEPHONE (OUTPATIENT)
Dept: PAIN MEDICINE | Facility: CLINIC | Age: 79
End: 2024-07-29
Payer: MEDICARE

## 2024-07-29 DIAGNOSIS — M54.12 CERVICAL RADICULITIS: ICD-10-CM

## 2024-07-29 DIAGNOSIS — G89.4 CHRONIC PAIN DISORDER: ICD-10-CM

## 2024-07-29 DIAGNOSIS — M48.062 NEUROGENIC CLAUDICATION DUE TO LUMBAR SPINAL STENOSIS: ICD-10-CM

## 2024-07-30 RX ORDER — TRAMADOL HYDROCHLORIDE 50 MG/1
50 TABLET ORAL 3 TIMES DAILY PRN
Qty: 90 TABLET | Refills: 0 | Status: SHIPPED | OUTPATIENT
Start: 2024-07-30

## 2024-08-07 ENCOUNTER — OFFICE VISIT (OUTPATIENT)
Dept: PAIN MEDICINE | Facility: CLINIC | Age: 79
End: 2024-08-07
Payer: MEDICARE

## 2024-08-07 VITALS
DIASTOLIC BLOOD PRESSURE: 66 MMHG | BODY MASS INDEX: 19.8 KG/M2 | SYSTOLIC BLOOD PRESSURE: 106 MMHG | WEIGHT: 119 LBS | HEART RATE: 77 BPM

## 2024-08-07 DIAGNOSIS — G89.4 CHRONIC PAIN SYNDROME: Primary | ICD-10-CM

## 2024-08-07 DIAGNOSIS — M47.27 OSTEOARTHRITIS OF SPINE WITH RADICULOPATHY, LUMBOSACRAL REGION: ICD-10-CM

## 2024-08-07 DIAGNOSIS — M54.17 LUMBOSACRAL RADICULOPATHY: ICD-10-CM

## 2024-08-07 LAB
AMPHETAMINES UR QL SCN: NORMAL
BARBITURATES UR QL SCN: NORMAL
BENZODIAZ UR QL SCN: NORMAL
BZE UR QL SCN: NORMAL
CANNABINOIDS UR QL SCN: NORMAL
FENTANYL+NORFENTANYL UR QL SCN: NORMAL
METHADONE UR QL SCN: NORMAL
OPIATES UR QL SCN: NORMAL
OXYCODONE+OXYMORPHONE UR QL SCN: NORMAL
PCP UR QL SCN: NORMAL

## 2024-08-07 PROCEDURE — 80307 DRUG TEST PRSMV CHEM ANLYZR: CPT | Performed by: PHYSICIAN ASSISTANT

## 2024-08-07 PROCEDURE — 99213 OFFICE O/P EST LOW 20 MIN: CPT | Performed by: PHYSICIAN ASSISTANT

## 2024-08-07 ASSESSMENT — ENCOUNTER SYMPTOMS
ARTHRALGIAS: 1
CARDIOVASCULAR NEGATIVE: 1
PSYCHIATRIC NEGATIVE: 1
NUMBNESS: 1
ALLERGIC/IMMUNOLOGIC NEGATIVE: 1
HEMATOLOGIC/LYMPHATIC NEGATIVE: 1
MYALGIAS: 1
EYES NEGATIVE: 1
CONSTITUTIONAL NEGATIVE: 1
GASTROINTESTINAL NEGATIVE: 1
WEAKNESS: 1
SHORTNESS OF BREATH: 1
ENDOCRINE NEGATIVE: 1
BACK PAIN: 1

## 2024-08-07 ASSESSMENT — PATIENT HEALTH QUESTIONNAIRE - PHQ9
2. FEELING DOWN, DEPRESSED OR HOPELESS: NOT AT ALL
1. LITTLE INTEREST OR PLEASURE IN DOING THINGS: NOT AT ALL
SUM OF ALL RESPONSES TO PHQ9 QUESTIONS 1 AND 2: 0

## 2024-08-07 ASSESSMENT — COLUMBIA-SUICIDE SEVERITY RATING SCALE - C-SSRS
1. IN THE PAST MONTH, HAVE YOU WISHED YOU WERE DEAD OR WISHED YOU COULD GO TO SLEEP AND NOT WAKE UP?: NO
6. HAVE YOU EVER DONE ANYTHING, STARTED TO DO ANYTHING, OR PREPARED TO DO ANYTHING TO END YOUR LIFE?: NO
2. HAVE YOU ACTUALLY HAD ANY THOUGHTS OF KILLING YOURSELF?: NO

## 2024-08-07 NOTE — PROGRESS NOTES
Subjective   Patient ID: Olayinka Melara is a 78 y.o. male who presents for Follow-up (FOLLOW UP RIGHT L3/4+ L4/5 TFESI DONE ON 4/5/24, HE HAS GOTTEN GOOD RELIEF FROM THE PROCEDURE, NOW HE JUST HAS A BIT OF AN ACHE AFTER 15 MINUTES OF STANDING AND WALKING HE NEEDS TO LEAN ON SOMETHING OR SIT FOR RELIEF, ). HE STATES TRAMADOL CONTINUE TO GIVE GOOD RELIEF FROM THE PAIN AND HE TAKES IT 3 TIMES DAILY, HE APPLIES THE DICLOFENAC GEL DAILY TO HIS LOWER BACK FOR RELIEF, HE AMBULATES WITH A CANE, HE PRESENTS ON OXYGEN, PAIN SCORE 4/10, SOAPP=3, THELMA=40%    Halle Donaldson, [facility] 08/07/24 10:15 AM     Patient is a 78-year-old male. He presents today for follow-up.  After undergoing a right-sided L3-4 and L4-5 transforaminal epidural steroid injection (done on 4/5/2024 and has given him 80% relief.  At this time he has some back pain that he rates a 4/10 but he states if he rests it gets better.  He states that things are going well.  He continues on his medications.  He continues to use tramadol 50 mg 1 p.o. 3 times daily as needed pain.  He tolerates this well.  No side effects.  He takes as prescribed.  Does not require refill.  At this time, he states that he is pleased with how he is feeling and how he is doing.  His wife is dealing with a lot.  She had some wound complications after surgery and she is still dealing with this.        Review of Systems   Constitutional: Negative.    HENT: Negative.     Eyes: Negative.    Respiratory:  Positive for shortness of breath.    Cardiovascular: Negative.    Gastrointestinal: Negative.    Endocrine: Negative.    Genitourinary: Negative.    Musculoskeletal:  Positive for arthralgias, back pain, gait problem and myalgias.   Skin: Negative.    Allergic/Immunologic: Negative.    Neurological:  Positive for weakness and numbness.   Hematological: Negative.    Psychiatric/Behavioral: Negative.         Objective   Physical Exam  Vitals and nursing note reviewed.   Constitutional:        General: He is not in acute distress.     Appearance: Normal appearance. He is not ill-appearing.   HENT:      Head: Normocephalic and atraumatic.      Right Ear: External ear normal.      Left Ear: External ear normal.      Nose: Nose normal.      Mouth/Throat:      Pharynx: Oropharynx is clear.   Eyes:      Conjunctiva/sclera: Conjunctivae normal.   Cardiovascular:      Rate and Rhythm: Normal rate and regular rhythm.      Pulses: Normal pulses.   Pulmonary:      Comments: On continuous oxygen  Musculoskeletal:         General: Normal range of motion.      Cervical back: Normal range of motion.      Comments: Ambulating with a cane  5/5 strength other than right hip flexion, ADF and EHL 4/5   Skin:     General: Skin is warm and dry.   Neurological:      General: No focal deficit present.      Mental Status: He is alert and oriented to person, place, and time. Mental status is at baseline.   Psychiatric:         Mood and Affect: Mood normal.         Behavior: Behavior normal.         Thought Content: Thought content normal.         Judgment: Judgment normal.         Assessment/Plan   Diagnoses and all orders for this visit:  Chronic pain syndrome  -     Drug Screen, Urine With Reflex to Confirmation  Lumbosacral radiculopathy  Osteoarthritis of spine with radiculopathy, lumbosacral region       Patient is a 78-year-old male with the above-mentioned medical diagnoses following up today after undergoing a right-sided L3-4 and L4-5 transforaminal epidural steroid injection.  This was all 4/5/2024 and has given him 80% relief.  He feels that between this and the tramadol he is overall feeling well.  He is doing well.  He is comfortable.  He is happy.  He uses the tramadol 3 times a day as needed for pain.  OARRS reviewed.  Most recent UDS was reviewed.  Will repeat 1 today for compliance purposes.  Narcan has been offered and declined.  At this time, he is going to follow-up in 2 to 3 months for reevaluation/medication  management.  We discussed should he desire an injection in the meantime he can call our services.  Otherwise we can discuss it at that time.  He does not want to do another 1 at this time as he feels that the previous one is still helping him.

## 2024-08-27 ENCOUNTER — TELEPHONE (OUTPATIENT)
Dept: PAIN MEDICINE | Facility: CLINIC | Age: 79
End: 2024-08-27
Payer: MEDICARE

## 2024-08-27 DIAGNOSIS — M48.062 NEUROGENIC CLAUDICATION DUE TO LUMBAR SPINAL STENOSIS: ICD-10-CM

## 2024-08-27 DIAGNOSIS — G89.4 CHRONIC PAIN DISORDER: ICD-10-CM

## 2024-08-27 DIAGNOSIS — M54.12 CERVICAL RADICULITIS: ICD-10-CM

## 2024-08-27 RX ORDER — TRAMADOL HYDROCHLORIDE 50 MG/1
50 TABLET ORAL 3 TIMES DAILY PRN
Qty: 90 TABLET | Refills: 0 | Status: SHIPPED | OUTPATIENT
Start: 2024-08-28

## 2024-08-31 ENCOUNTER — LAB (OUTPATIENT)
Dept: LAB | Facility: LAB | Age: 79
End: 2024-08-31
Payer: MEDICARE

## 2024-08-31 DIAGNOSIS — E61.1 IRON DEFICIENCY: ICD-10-CM

## 2024-08-31 DIAGNOSIS — D63.8 ANEMIA OF CHRONIC DISEASE: ICD-10-CM

## 2024-08-31 LAB
ALBUMIN SERPL BCP-MCNC: 4 G/DL (ref 3.4–5)
ALP SERPL-CCNC: 61 U/L (ref 33–136)
ALT SERPL W P-5'-P-CCNC: 13 U/L (ref 10–52)
ANION GAP SERPL CALC-SCNC: 11 MMOL/L (ref 10–20)
AST SERPL W P-5'-P-CCNC: 13 U/L (ref 9–39)
BASOPHILS # BLD AUTO: 0.05 X10*3/UL (ref 0–0.1)
BASOPHILS NFR BLD AUTO: 0.8 %
BILIRUB SERPL-MCNC: 0.8 MG/DL (ref 0–1.2)
BUN SERPL-MCNC: 18 MG/DL (ref 6–23)
CALCIUM SERPL-MCNC: 9 MG/DL (ref 8.6–10.3)
CHLORIDE SERPL-SCNC: 102 MMOL/L (ref 98–107)
CO2 SERPL-SCNC: 29 MMOL/L (ref 21–32)
CREAT SERPL-MCNC: 0.64 MG/DL (ref 0.5–1.3)
EGFRCR SERPLBLD CKD-EPI 2021: >90 ML/MIN/1.73M*2
EOSINOPHIL # BLD AUTO: 0.07 X10*3/UL (ref 0–0.4)
EOSINOPHIL NFR BLD AUTO: 1.1 %
ERYTHROCYTE [DISTWIDTH] IN BLOOD BY AUTOMATED COUNT: 16.7 % (ref 11.5–14.5)
FERRITIN SERPL-MCNC: 24 NG/ML (ref 20–300)
GLUCOSE SERPL-MCNC: 102 MG/DL (ref 74–99)
HCT VFR BLD AUTO: 30.6 % (ref 41–52)
HGB BLD-MCNC: 9.4 G/DL (ref 13.5–17.5)
HGB RETIC QN: 30 PG (ref 28–38)
IGA SERPL-MCNC: 256 MG/DL (ref 70–400)
IGG SERPL-MCNC: 726 MG/DL (ref 700–1600)
IGM SERPL-MCNC: 13 MG/DL (ref 40–230)
IMM GRANULOCYTES # BLD AUTO: 0.06 X10*3/UL (ref 0–0.5)
IMM GRANULOCYTES NFR BLD AUTO: 1 % (ref 0–0.9)
IMMATURE RETIC FRACTION: 24.4 %
IRON SATN MFR SERPL: 42 % (ref 25–45)
IRON SERPL-MCNC: 143 UG/DL (ref 35–150)
LYMPHOCYTES # BLD AUTO: 1.02 X10*3/UL (ref 0.8–3)
LYMPHOCYTES NFR BLD AUTO: 16.7 %
MCH RBC QN AUTO: 27.5 PG (ref 26–34)
MCHC RBC AUTO-ENTMCNC: 30.7 G/DL (ref 32–36)
MCV RBC AUTO: 90 FL (ref 80–100)
MONOCYTES # BLD AUTO: 0.49 X10*3/UL (ref 0.05–0.8)
MONOCYTES NFR BLD AUTO: 8 %
NEUTROPHILS # BLD AUTO: 4.43 X10*3/UL (ref 1.6–5.5)
NEUTROPHILS NFR BLD AUTO: 72.4 %
NRBC BLD-RTO: 0 /100 WBCS (ref 0–0)
PLATELET # BLD AUTO: 414 X10*3/UL (ref 150–450)
POTASSIUM SERPL-SCNC: 4 MMOL/L (ref 3.5–5.3)
PROT SERPL-MCNC: 6.1 G/DL (ref 6.4–8.2)
PROT SERPL-MCNC: 6.3 G/DL (ref 6.4–8.2)
RBC # BLD AUTO: 3.42 X10*6/UL (ref 4.5–5.9)
RETICS #: 0.1 X10*6/UL (ref 0.02–0.11)
RETICS/RBC NFR AUTO: 3 % (ref 0.5–2)
SODIUM SERPL-SCNC: 138 MMOL/L (ref 136–145)
TIBC SERPL-MCNC: 342 UG/DL (ref 240–445)
UIBC SERPL-MCNC: 199 UG/DL (ref 110–370)
VIT B12 SERPL-MCNC: 509 PG/ML (ref 211–911)
WBC # BLD AUTO: 6.1 X10*3/UL (ref 4.4–11.3)

## 2024-08-31 PROCEDURE — 83521 IG LIGHT CHAINS FREE EACH: CPT

## 2024-08-31 PROCEDURE — 82784 ASSAY IGA/IGD/IGG/IGM EACH: CPT

## 2024-08-31 PROCEDURE — 80053 COMPREHEN METABOLIC PANEL: CPT

## 2024-08-31 PROCEDURE — 85025 COMPLETE CBC W/AUTO DIFF WBC: CPT

## 2024-08-31 PROCEDURE — 82728 ASSAY OF FERRITIN: CPT

## 2024-08-31 PROCEDURE — 83550 IRON BINDING TEST: CPT

## 2024-08-31 PROCEDURE — 85045 AUTOMATED RETICULOCYTE COUNT: CPT

## 2024-08-31 PROCEDURE — 84165 PROTEIN E-PHORESIS SERUM: CPT

## 2024-08-31 PROCEDURE — 84155 ASSAY OF PROTEIN SERUM: CPT

## 2024-08-31 PROCEDURE — 83540 ASSAY OF IRON: CPT

## 2024-08-31 PROCEDURE — 36415 COLL VENOUS BLD VENIPUNCTURE: CPT

## 2024-08-31 PROCEDURE — 82607 VITAMIN B-12: CPT

## 2024-09-01 LAB
KAPPA LC SERPL-MCNC: 2.2 MG/DL (ref 0.33–1.94)
KAPPA LC/LAMBDA SER: 1.07 {RATIO} (ref 0.26–1.65)
LAMBDA LC SERPL-MCNC: 2.06 MG/DL (ref 0.57–2.63)

## 2024-09-03 LAB
ALBUMIN: 3.7 G/DL (ref 3.4–5)
ALPHA 1 GLOBULIN: 0.3 G/DL (ref 0.2–0.6)
ALPHA 2 GLOBULIN: 0.8 G/DL (ref 0.4–1.1)
BETA GLOBULIN: 0.8 G/DL (ref 0.5–1.2)
GAMMA GLOBULIN: 0.6 G/DL (ref 0.5–1.4)
PATH REVIEW-SERUM PROTEIN ELECTROPHORESIS: NORMAL
PROTEIN ELECTROPHORESIS COMMENT: NORMAL

## 2024-09-09 ENCOUNTER — OFFICE VISIT (OUTPATIENT)
Dept: HEMATOLOGY/ONCOLOGY | Facility: CLINIC | Age: 79
End: 2024-09-09
Payer: MEDICARE

## 2024-09-09 VITALS
HEART RATE: 73 BPM | WEIGHT: 118.6 LBS | RESPIRATION RATE: 20 BRPM | TEMPERATURE: 96.8 F | BODY MASS INDEX: 19.74 KG/M2 | DIASTOLIC BLOOD PRESSURE: 62 MMHG | SYSTOLIC BLOOD PRESSURE: 100 MMHG | OXYGEN SATURATION: 95 %

## 2024-09-09 DIAGNOSIS — E61.1 IRON DEFICIENCY: ICD-10-CM

## 2024-09-09 DIAGNOSIS — D63.8 ANEMIA OF CHRONIC DISEASE: ICD-10-CM

## 2024-09-09 PROCEDURE — 99213 OFFICE O/P EST LOW 20 MIN: CPT

## 2024-09-09 PROCEDURE — 1126F AMNT PAIN NOTED NONE PRSNT: CPT

## 2024-09-09 PROCEDURE — 1157F ADVNC CARE PLAN IN RCRD: CPT

## 2024-09-09 PROCEDURE — 1159F MED LIST DOCD IN RCRD: CPT

## 2024-09-09 ASSESSMENT — PAIN SCALES - GENERAL: PAINLEVEL: 0-NO PAIN

## 2024-09-09 NOTE — PROGRESS NOTES
Patient ID: Olayinka Melara is a 78 y.o. male.    Subjective    HPI  HPI  Chief Complaint: Evaluation for anemia   Interval History:    PCP : Dr. Aaron     Reason for referral: Anemia     HPI  75 year old man with pmh of BPH, COPD, kyphoscoliosis, Lumbosacral spondylosis, referred for anemia. He has chronic anemia and progressively dropping Hg 11-12 g/dl last year to 8.6 g/dl in Sep 2021 until recently he had a visit to ER on 10/22/21 for SoB where his Hg was 7.0 g/dl. MCV is 64, his ferritin was 10 , b12 and ferritin were normal last year. He was transfused 1 unit PRBC and told to follow hematology  He has been feeling more tired recently , he has chronic fatigue that worsened near the time he went to the emergency room   He has chronic SOB and DUARTE from COPD and actually was having some hemoptysis with blood tinged sputum that day . This has improved   He has occasionally seeing melena around once per month   He has chronic  epigastric pain and occasional nausea   He is not active and does not have energy to do much activities   Weight fluctuating but mostly same range of 112-118   He is no urinary complaints   No changes in hair or nails      EGD on 1/21/22 showed erosive gastropathy with stigmata of bleeding, biopsies showed healing ulcers , no malignancy      interval history - 9/9/24     Completed pulmonary rehab, seemed to have helped     Reports he started getting Bronchiolitis about two weeks ago, is starting to get over it     Energy has slightly improved, remains fatigued  SOB depends on the weather recently, increased to O2 3L [place]  Remains to have increased DUARTE with stairs  Pulse Ox 96%, dropped to 70% a few times     Eating and drinking okay   Bruises easily   No abnormal bleeding events    Single episode of epitaxies, cleared up with saline nasal spray   No chest pain or pressure   No fever, chills, or infections   Occasional night sweats 2-3 times weekly     Chronic Productive cough, clear thick  phlegm      No abdominal pain /cramping   No diarrhea or constipation   Frequent urination, no dysuria or hematuria     Occasional numbness or tingling in hands or feet  No new concerns        Objective    BSA: There is no height or weight on file to calculate BSA.  There were no vitals taken for this visit.     Physical Exam  Vitals reviewed.   Constitutional:       Appearance: Normal appearance.   HENT:      Head: Normocephalic and atraumatic.      Nose: Nose normal.      Mouth/Throat:      Mouth: Mucous membranes are moist.      Pharynx: Oropharynx is clear.   Eyes:      General: No scleral icterus.     Extraocular Movements: Extraocular movements intact.      Conjunctiva/sclera: Conjunctivae normal.   Cardiovascular:      Rate and Rhythm: Normal rate and regular rhythm.      Pulses: Normal pulses.      Heart sounds: Normal heart sounds.   Pulmonary:      Effort: Pulmonary effort is normal.      Breath sounds: Decreased air movement present.      Comments: Continuous O2 3L NC   Abdominal:      Palpations: Abdomen is soft. There is no mass.      Tenderness: There is no abdominal tenderness.   Musculoskeletal:         General: Normal range of motion.      Cervical back: Normal range of motion.   Skin:     General: Skin is warm and dry.   Neurological:      General: No focal deficit present.      Mental Status: He is alert and oriented to person, place, and time.   Psychiatric:         Mood and Affect: Mood normal.         Behavior: Behavior normal.         Thought Content: Thought content normal.         Judgment: Judgment normal.         Performance Status:  Symptomatic; fully ambulatory      Assessment/Plan      1. Anemia   secondary to iron deficiency on a likely AOCD  s/p one unit PRBC transfusion in ER in Oct 2021      have received 2 course of IV iron   improved Hg now to 11.4 g/dl and sufficient iron stores      remaining labs normal/ negative      EGD showed evidence of upper GI bleed with erosive  "gastropathy/ ulcer      11/29/22- His energy remains low and he feels weak. His breathing is stable, remains on 2L oxygen. Denies any abnormal bleeding or bruising issues. Is taking PO iron daily, and tolerating well.      He is slightly more anemic and iron deficient. Hgb (10.7), Iron Serum (70), Ferritin (34), TIBC (319), Tsat (22%), Vitamin B12 (527). Discussed with patient and his wife doing two doses of Venofer and will repeat labs in 8 weeks after second dose of Venofer.     10/12/23:  Energy continues to be low. Breathing is stable, home oxygen 2L nasal canula. No major bleeding events, patient does bruise easy.  No constitutional \"B\" symptoms reported.       He remains mildly anemic with improvement Hgb of 11.0g/dl.  Iron parameters are once again deficient. Fe 29, T sat 8%, Ferritin 29, B12 721.  Discussed ordering IV iron for iron replacement. Will continue to monitor labs closely.         6/10/24: Patient is set to complete pulmonary rehab this week, feels as though it has helped some. He remains to feel fatigued, at times legs feel weak. Ambulates with a cane. Denies any recent falls. DUARTE remains unchanged, mainly with walking and stairs. Continue on continuous home oxygen ranging from 2.5-3.0 L N[place] depending on his activity. Energy varies from day day to day. Bruises easily, denies any abnormal bleeding. No GI or Urinary issues.  Eating and drinking well. No constitutional \"B\" symptoms.     Reviewed labs: Hg 10.5, WBC 3.8, Plts 340k  Fe 55, Fe sat 23%, Ferritin 249, B12 587     Patient remains anemic with a Hg of 10.5, but stable, Discussed likely secondary to chronic disease, and inflammatory processes. Iron parameters are stable, will restart oral iron every other day. WBC has slightly trended down to 3.8, will continue to monitor at this time, patient denies any constitutional \"B\" symptoms.      9/9/24: Patient is in office for routine follow-up. Remains mildly anemic with a Hg of 9.4. Iron parameters " remain stable- Fe 143, Fe sat 42%, Ferritin 24, B12 509. Discussed anemia is likely of chronic disease. Plan will remain continuation of oral iron as same and monitor labs closely.         2. COPD Severe (HCC)  -managed by Pulmonology  -dependent on supplemental oxygen 2-3L/min   Recent CT scan on 7/3/24 reported-diffuse bilateral bronchitis with scattered peripheral airway mucoid impactions, volume loss of the left upper lobe with left upper lobe/lingular bronchiectasis, background changes of significant emphysema, dilatation of the aortic root at 4.0 cm.  Atherosclerotic vascular disease and multivessel coronary artery calcifications and Scattered bilateral centrilobular tiny nodules/nodular clusters suggesting bronchiolitis or aspiration.  Follow-up suggested in 3 months, pulmonology is managing       Plan:  Encouraged to increase protein, intake- suggested boost, ensure or clear protein drink such as protein 2.0- offered nutritional consult- deferred at this time  Increase oral iron to daily, with source of Vitamin C, don't take iron with dairy products or anti-acids  RTC in 3 months with labs prior  (CBC w/diff, CMP, Ferritin, Iron, B12, Retic, Magnesium, )         Rola Luciano, LUCIANA-CNP

## 2024-09-09 NOTE — PROGRESS NOTES
Instructed to get labs prior to next appt  Rtc 12/12 1000 for CNP visit  Reviewed AVS with patient- patient verbalizes understanding

## 2024-09-09 NOTE — PATIENT INSTRUCTIONS
Encouraged to increase protein, intake- suggested boost, ensure or clear protein drink such as protein 2.0- offered nutritional consult- deferred at this time  Increase oral iron to daily, with source of Vitamin C, don't take iron with dairy products or anti-acids  RTC in 3 months with labs prior    (CBC w/diff, CMP, Ferritin, Iron, B12, Retic, Magnesium, )

## 2024-09-24 ENCOUNTER — TELEPHONE (OUTPATIENT)
Dept: PAIN MEDICINE | Facility: CLINIC | Age: 79
End: 2024-09-24
Payer: MEDICARE

## 2024-09-24 DIAGNOSIS — G89.4 CHRONIC PAIN DISORDER: ICD-10-CM

## 2024-09-24 DIAGNOSIS — M48.062 NEUROGENIC CLAUDICATION DUE TO LUMBAR SPINAL STENOSIS: ICD-10-CM

## 2024-09-24 DIAGNOSIS — M54.12 CERVICAL RADICULITIS: ICD-10-CM

## 2024-09-24 RX ORDER — TRAMADOL HYDROCHLORIDE 50 MG/1
50 TABLET ORAL 3 TIMES DAILY PRN
Qty: 90 TABLET | Refills: 0 | Status: SHIPPED | OUTPATIENT
Start: 2024-09-26

## 2024-10-01 ENCOUNTER — APPOINTMENT (OUTPATIENT)
Dept: PAIN MEDICINE | Facility: CLINIC | Age: 79
End: 2024-10-01
Payer: MEDICARE

## 2024-10-02 ENCOUNTER — APPOINTMENT (OUTPATIENT)
Dept: PAIN MEDICINE | Facility: CLINIC | Age: 79
End: 2024-10-02
Payer: MEDICARE

## 2024-10-08 ENCOUNTER — OFFICE VISIT (OUTPATIENT)
Dept: PAIN MEDICINE | Facility: CLINIC | Age: 79
End: 2024-10-08
Payer: MEDICARE

## 2024-10-08 VITALS
DIASTOLIC BLOOD PRESSURE: 53 MMHG | BODY MASS INDEX: 19.66 KG/M2 | HEART RATE: 78 BPM | RESPIRATION RATE: 22 BRPM | SYSTOLIC BLOOD PRESSURE: 88 MMHG | HEIGHT: 65 IN | WEIGHT: 118 LBS

## 2024-10-08 DIAGNOSIS — M47.27 OSTEOARTHRITIS OF SPINE WITH RADICULOPATHY, LUMBOSACRAL REGION: ICD-10-CM

## 2024-10-08 DIAGNOSIS — M54.17 LUMBOSACRAL RADICULOPATHY: Primary | ICD-10-CM

## 2024-10-08 PROCEDURE — 99214 OFFICE O/P EST MOD 30 MIN: CPT

## 2024-10-08 ASSESSMENT — ENCOUNTER SYMPTOMS
DYSPHORIC MOOD: 0
SHORTNESS OF BREATH: 0
BACK PAIN: 1
ARTHRALGIAS: 0
LIGHT-HEADEDNESS: 0
FACIAL ASYMMETRY: 0
WEAKNESS: 0
ALLERGIC/IMMUNOLOGIC NEGATIVE: 1
COUGH: 0
ENDOCRINE NEGATIVE: 1
BRUISES/BLEEDS EASILY: 0
CONSTITUTIONAL NEGATIVE: 1
EYES NEGATIVE: 1
PALPITATIONS: 0
ADENOPATHY: 0
WHEEZING: 0
MYALGIAS: 1

## 2024-10-08 ASSESSMENT — PAIN SCALES - GENERAL: PAINLEVEL: 6

## 2024-10-08 NOTE — PROGRESS NOTES
Subjective   Patient ID: Olayinka Melara is a 78 y.o. male who presents for Back Pain (Patient is here to follow up with right lower back pain.  It radiates down his right leg to his knee.  He denied numbness or tingling.  Patient rates his pain a 6/10 today.  It is worse when ambulating.  ).  THELMA score 51.  Sondra Gross RN 10/08/24 1:26 PM     The patient is a 78-year-old male who presents today for a follow-up appointment for medication management.  He currently complains of right-sided low back pain that radiates down the back of his leg to his knee, but denies numbness or tingling.  He says the pain comes and goes.  He last had a right sided L3-4 and L4-5 TFESI on/5/24.  He still feels as if he is getting at least moderate relief from this procedure, and does not quite feel as if it is time to repeat this injection yet, but says it may be after the first of the year we can reevaluate and see how things are feeling.  He says his wife has been in and out of the hospital a lot since July, and he has not of going on right now to worry about.  At his most recent visit he was continued on tramadol 50 mg 3 times a day as needed.  He is just wanting to maintain on current regimen at this time, does not want to make any changes today.        Review of Systems   Constitutional: Negative.    HENT: Negative.     Eyes: Negative.    Respiratory:  Negative for cough, shortness of breath and wheezing.    Cardiovascular:  Negative for chest pain, palpitations and leg swelling.   Endocrine: Negative.    Genitourinary: Negative.    Musculoskeletal:  Positive for back pain and myalgias. Negative for arthralgias.   Skin: Negative.    Allergic/Immunologic: Negative.    Neurological:  Negative for facial asymmetry, weakness and light-headedness.   Hematological:  Negative for adenopathy. Does not bruise/bleed easily.   Psychiatric/Behavioral:  Negative for dysphoric mood and suicidal ideas.        Objective   Physical  Exam  Constitutional:       General: He is not in acute distress.     Appearance: Normal appearance.   HENT:      Head: Normocephalic.      Mouth/Throat:      Mouth: Mucous membranes are moist.   Eyes:      Extraocular Movements: Extraocular movements intact.   Cardiovascular:      Rate and Rhythm: Normal rate and regular rhythm.      Pulses: Normal pulses.      Heart sounds: Normal heart sounds. No murmur heard.     No friction rub. No gallop.   Pulmonary:      Effort: Pulmonary effort is normal.      Breath sounds: Normal breath sounds. No wheezing, rhonchi or rales.      Comments: On nasal cannula  Abdominal:      General: Abdomen is flat.      Palpations: Abdomen is soft.   Musculoskeletal:      Cervical back: Normal range of motion.      Right lower leg: No edema.      Left lower leg: No edema.      Comments: Ambulates with a walker  Strength 5/5 BLE   Lymphadenopathy:      Cervical: No cervical adenopathy.   Skin:     General: Skin is warm and dry.   Neurological:      General: No focal deficit present.      Mental Status: He is alert and oriented to person, place, and time. Mental status is at baseline.   Psychiatric:         Mood and Affect: Mood normal.         Behavior: Behavior normal.         Assessment/Plan   Diagnoses and all orders for this visit:  Lumbosacral radiculopathy  Osteoarthritis of spine with radiculopathy, lumbosacral region       The patient is a 78-year-old male with a past medical history significant for the above-mentioned problems.  He currently rates his pain a 6/10 on the right side of his lower back radiating down the back of his leg to his knee, but denies any numbness and tingling.  He is still getting good relief from the lumbar TFESI he had in April of this year, and says that maybe in January we can reevaluate to see if it needs repeated at that time yet.  At his most recent visit he was continued on tramadol 50 mg 3 times a day as needed.  He says this medication helps take  the edge off of his pain and improve his level of functioning overall.  He denies side effects to this medication, and no evidence of aberrant drug-seeking behaviors.  His PDMP was reviewed, pain treatment agreement is up-to-date, urine drug screen is up-to-date, risks and benefits of opioid therapy were discussed with the patient, naloxone was offered to the patient within the last year.  At this time, he is wanting to maintain on the current regimen, he will follow-up with us in 3 months, call the clinic sooner if needed.

## 2024-10-25 ENCOUNTER — OFFICE VISIT (OUTPATIENT)
Age: 79
End: 2024-10-25
Payer: MEDICARE

## 2024-10-25 VITALS
OXYGEN SATURATION: 94 % | WEIGHT: 118.6 LBS | HEIGHT: 65 IN | DIASTOLIC BLOOD PRESSURE: 54 MMHG | HEART RATE: 70 BPM | SYSTOLIC BLOOD PRESSURE: 88 MMHG | BODY MASS INDEX: 19.76 KG/M2

## 2024-10-25 DIAGNOSIS — J41.8 MIXED SIMPLE AND MUCOPURULENT CHRONIC BRONCHITIS (MULTI): Primary | ICD-10-CM

## 2024-10-25 PROCEDURE — 1123F ACP DISCUSS/DSCN MKR DOCD: CPT | Performed by: FAMILY MEDICINE

## 2024-10-25 PROCEDURE — 1036F TOBACCO NON-USER: CPT | Performed by: FAMILY MEDICINE

## 2024-10-25 PROCEDURE — 99213 OFFICE O/P EST LOW 20 MIN: CPT | Performed by: FAMILY MEDICINE

## 2024-10-25 PROCEDURE — 1159F MED LIST DOCD IN RCRD: CPT | Performed by: FAMILY MEDICINE

## 2024-10-25 PROCEDURE — 1158F ADVNC CARE PLAN TLK DOCD: CPT | Performed by: FAMILY MEDICINE

## 2024-10-25 PROCEDURE — 1160F RVW MEDS BY RX/DR IN RCRD: CPT | Performed by: FAMILY MEDICINE

## 2024-10-25 PROCEDURE — 1157F ADVNC CARE PLAN IN RCRD: CPT | Performed by: FAMILY MEDICINE

## 2024-10-25 PROCEDURE — G2211 COMPLEX E/M VISIT ADD ON: HCPCS | Performed by: FAMILY MEDICINE

## 2024-10-25 RX ORDER — AZITHROMYCIN 250 MG/1
TABLET, FILM COATED ORAL
Qty: 6 TABLET | Refills: 0 | Status: SHIPPED | OUTPATIENT
Start: 2024-10-25 | End: 2024-10-29

## 2024-10-25 RX ORDER — PREDNISONE 10 MG/1
TABLET ORAL
Qty: 30 TABLET | Refills: 0 | Status: SHIPPED | OUTPATIENT
Start: 2024-10-25 | End: 2024-11-06

## 2024-10-25 ASSESSMENT — ENCOUNTER SYMPTOMS
FATIGUE: 1
SHORTNESS OF BREATH: 1
WHEEZING: 1
NAUSEA: 0
PALPITATIONS: 0
COUGH: 1
WEAKNESS: 1
CHEST TIGHTNESS: 0

## 2024-10-25 NOTE — PROGRESS NOTES
"Subjective   Patient ID: Olayinka Melara is a 78 y.o. male who presents for Cough (Cough x 3 weeks, mostly non-productive ).    HPI   A lot of stress with his wife having medical problems.  Definitely having more troubles breathing up to 3 L on the oxygen.  Not really coughing up more but definitely having more shortness of breath.  And more tiredness.    Do not hear any adventitial sounds on exam, poor air exchange bilaterally.    Z-Santosh  Prednisone taper  Continue oxygen  Continue COPD meds.    Review of Systems   Constitutional:  Positive for fatigue.   Respiratory:  Positive for cough, shortness of breath and wheezing. Negative for chest tightness.    Cardiovascular:  Negative for chest pain and palpitations.   Gastrointestinal:  Negative for nausea.   Neurological:  Positive for weakness.       Objective   BP 88/54   Pulse 70   Ht 1.651 m (5' 5\")   Wt 53.8 kg (118 lb 9.6 oz)   SpO2 94%   BMI 19.74 kg/m²     Physical Exam  Constitutional:       Appearance: Normal appearance.   HENT:      Head: Normocephalic and atraumatic.   Cardiovascular:      Rate and Rhythm: Normal rate and regular rhythm.      Heart sounds: Normal heart sounds.   Pulmonary:      Effort: Pulmonary effort is normal. No respiratory distress.      Breath sounds: Decreased air movement present. No wheezing, rhonchi or rales.   Skin:     General: Skin is warm and dry.   Neurological:      General: No focal deficit present.      Mental Status: He is alert and oriented to person, place, and time.   Psychiatric:         Mood and Affect: Mood normal.         Behavior: Behavior normal.         Thought Content: Thought content normal.         Judgment: Judgment normal.         Assessment/Plan   Problem List Items Addressed This Visit             ICD-10-CM    COPD (chronic obstructive pulmonary disease) (Multi) - Primary J44.9    Relevant Medications    azithromycin (Zithromax) 250 mg tablet    predniSONE (Deltasone) 10 mg tablet          "

## 2024-10-28 ENCOUNTER — TELEPHONE (OUTPATIENT)
Dept: PAIN MEDICINE | Facility: CLINIC | Age: 79
End: 2024-10-28
Payer: MEDICARE

## 2024-10-28 DIAGNOSIS — M48.062 NEUROGENIC CLAUDICATION DUE TO LUMBAR SPINAL STENOSIS: ICD-10-CM

## 2024-10-28 DIAGNOSIS — G89.4 CHRONIC PAIN DISORDER: ICD-10-CM

## 2024-10-28 DIAGNOSIS — M54.12 CERVICAL RADICULITIS: ICD-10-CM

## 2024-10-29 RX ORDER — TRAMADOL HYDROCHLORIDE 50 MG/1
50 TABLET ORAL 3 TIMES DAILY PRN
Qty: 90 TABLET | Refills: 0 | Status: SHIPPED | OUTPATIENT
Start: 2024-10-29

## 2024-11-11 ENCOUNTER — TELEPHONE (OUTPATIENT)
Dept: GASTROENTEROLOGY | Facility: CLINIC | Age: 79
End: 2024-11-11
Payer: MEDICARE

## 2024-11-25 ENCOUNTER — TELEPHONE (OUTPATIENT)
Dept: PAIN MEDICINE | Facility: CLINIC | Age: 79
End: 2024-11-25
Payer: MEDICARE

## 2024-11-26 DIAGNOSIS — M54.12 CERVICAL RADICULITIS: ICD-10-CM

## 2024-11-26 DIAGNOSIS — M48.062 NEUROGENIC CLAUDICATION DUE TO LUMBAR SPINAL STENOSIS: ICD-10-CM

## 2024-11-26 DIAGNOSIS — G89.4 CHRONIC PAIN DISORDER: ICD-10-CM

## 2024-11-26 RX ORDER — TRAMADOL HYDROCHLORIDE 50 MG/1
50 TABLET ORAL 3 TIMES DAILY PRN
Qty: 90 TABLET | Refills: 0 | Status: SHIPPED | OUTPATIENT
Start: 2024-11-26

## 2024-12-05 ENCOUNTER — LAB (OUTPATIENT)
Dept: LAB | Facility: LAB | Age: 79
End: 2024-12-05
Payer: MEDICARE

## 2024-12-05 DIAGNOSIS — D63.8 ANEMIA OF CHRONIC DISEASE: ICD-10-CM

## 2024-12-05 DIAGNOSIS — E61.1 IRON DEFICIENCY: ICD-10-CM

## 2024-12-05 LAB
ALBUMIN SERPL BCP-MCNC: 3.8 G/DL (ref 3.4–5)
ALP SERPL-CCNC: 74 U/L (ref 33–136)
ALT SERPL W P-5'-P-CCNC: 4 U/L (ref 10–52)
ANION GAP SERPL CALC-SCNC: 12 MMOL/L (ref 10–20)
AST SERPL W P-5'-P-CCNC: 9 U/L (ref 9–39)
BASOPHILS # BLD AUTO: 0.04 X10*3/UL (ref 0–0.1)
BASOPHILS NFR BLD AUTO: 0.8 %
BILIRUB SERPL-MCNC: 0.6 MG/DL (ref 0–1.2)
BUN SERPL-MCNC: 14 MG/DL (ref 6–23)
CALCIUM SERPL-MCNC: 8.9 MG/DL (ref 8.6–10.3)
CHLORIDE SERPL-SCNC: 98 MMOL/L (ref 98–107)
CO2 SERPL-SCNC: 30 MMOL/L (ref 21–32)
CREAT SERPL-MCNC: 0.56 MG/DL (ref 0.5–1.3)
EGFRCR SERPLBLD CKD-EPI 2021: >90 ML/MIN/1.73M*2
EOSINOPHIL # BLD AUTO: 0.04 X10*3/UL (ref 0–0.4)
EOSINOPHIL NFR BLD AUTO: 0.8 %
ERYTHROCYTE [DISTWIDTH] IN BLOOD BY AUTOMATED COUNT: 18.4 % (ref 11.5–14.5)
FERRITIN SERPL-MCNC: 25 NG/ML (ref 20–300)
GLUCOSE SERPL-MCNC: 81 MG/DL (ref 74–99)
HCT VFR BLD AUTO: 32 % (ref 41–52)
HGB BLD-MCNC: 9.4 G/DL (ref 13.5–17.5)
HGB RETIC QN: 27 PG (ref 28–38)
IMM GRANULOCYTES # BLD AUTO: 0.03 X10*3/UL (ref 0–0.5)
IMM GRANULOCYTES NFR BLD AUTO: 0.6 % (ref 0–0.9)
IMMATURE RETIC FRACTION: 21.6 %
IRON SATN MFR SERPL: 3 % (ref 25–45)
IRON SERPL-MCNC: 11 UG/DL (ref 35–150)
LYMPHOCYTES # BLD AUTO: 1.1 X10*3/UL (ref 0.8–3)
LYMPHOCYTES NFR BLD AUTO: 21.1 %
MAGNESIUM SERPL-MCNC: 2.09 MG/DL (ref 1.6–2.4)
MCH RBC QN AUTO: 25.2 PG (ref 26–34)
MCHC RBC AUTO-ENTMCNC: 29.4 G/DL (ref 32–36)
MCV RBC AUTO: 86 FL (ref 80–100)
MONOCYTES # BLD AUTO: 0.55 X10*3/UL (ref 0.05–0.8)
MONOCYTES NFR BLD AUTO: 10.6 %
NEUTROPHILS # BLD AUTO: 3.45 X10*3/UL (ref 1.6–5.5)
NEUTROPHILS NFR BLD AUTO: 66.1 %
NRBC BLD-RTO: 0 /100 WBCS (ref 0–0)
PLATELET # BLD AUTO: 458 X10*3/UL (ref 150–450)
POTASSIUM SERPL-SCNC: 4 MMOL/L (ref 3.5–5.3)
PROT SERPL-MCNC: 6 G/DL (ref 6.4–8.2)
RBC # BLD AUTO: 3.73 X10*6/UL (ref 4.5–5.9)
RETICS #: 0.1 X10*6/UL (ref 0.02–0.11)
RETICS/RBC NFR AUTO: 2.7 % (ref 0.5–2)
SODIUM SERPL-SCNC: 136 MMOL/L (ref 136–145)
TIBC SERPL-MCNC: 331 UG/DL (ref 240–445)
UIBC SERPL-MCNC: 320 UG/DL (ref 110–370)
WBC # BLD AUTO: 5.2 X10*3/UL (ref 4.4–11.3)

## 2024-12-05 PROCEDURE — 83550 IRON BINDING TEST: CPT

## 2024-12-05 PROCEDURE — 83540 ASSAY OF IRON: CPT

## 2024-12-05 PROCEDURE — 85045 AUTOMATED RETICULOCYTE COUNT: CPT

## 2024-12-05 PROCEDURE — 83735 ASSAY OF MAGNESIUM: CPT

## 2024-12-05 PROCEDURE — 36415 COLL VENOUS BLD VENIPUNCTURE: CPT

## 2024-12-05 PROCEDURE — 80053 COMPREHEN METABOLIC PANEL: CPT

## 2024-12-05 PROCEDURE — 85025 COMPLETE CBC W/AUTO DIFF WBC: CPT

## 2024-12-05 PROCEDURE — 82607 VITAMIN B-12: CPT

## 2024-12-05 PROCEDURE — 82728 ASSAY OF FERRITIN: CPT

## 2024-12-06 LAB — VIT B12 SERPL-MCNC: 822 PG/ML (ref 211–911)

## 2024-12-12 ENCOUNTER — OFFICE VISIT (OUTPATIENT)
Dept: HEMATOLOGY/ONCOLOGY | Facility: CLINIC | Age: 79
End: 2024-12-12
Payer: MEDICARE

## 2024-12-12 VITALS
SYSTOLIC BLOOD PRESSURE: 83 MMHG | HEART RATE: 76 BPM | TEMPERATURE: 97.9 F | WEIGHT: 117.6 LBS | RESPIRATION RATE: 24 BRPM | DIASTOLIC BLOOD PRESSURE: 50 MMHG | OXYGEN SATURATION: 99 % | BODY MASS INDEX: 19.57 KG/M2

## 2024-12-12 DIAGNOSIS — E61.1 IRON DEFICIENCY: ICD-10-CM

## 2024-12-12 DIAGNOSIS — D63.8 ANEMIA OF CHRONIC DISEASE: Primary | ICD-10-CM

## 2024-12-12 PROCEDURE — 1157F ADVNC CARE PLAN IN RCRD: CPT

## 2024-12-12 PROCEDURE — 1126F AMNT PAIN NOTED NONE PRSNT: CPT

## 2024-12-12 PROCEDURE — 99213 OFFICE O/P EST LOW 20 MIN: CPT

## 2024-12-12 PROCEDURE — 1159F MED LIST DOCD IN RCRD: CPT

## 2024-12-12 RX ORDER — ALBUTEROL SULFATE 0.83 MG/ML
3 SOLUTION RESPIRATORY (INHALATION) AS NEEDED
OUTPATIENT
Start: 2024-12-12

## 2024-12-12 RX ORDER — EPINEPHRINE 0.3 MG/.3ML
0.3 INJECTION SUBCUTANEOUS EVERY 5 MIN PRN
OUTPATIENT
Start: 2024-12-12

## 2024-12-12 RX ORDER — DIPHENHYDRAMINE HYDROCHLORIDE 50 MG/ML
50 INJECTION INTRAMUSCULAR; INTRAVENOUS AS NEEDED
OUTPATIENT
Start: 2024-12-12

## 2024-12-12 RX ORDER — FAMOTIDINE 10 MG/ML
20 INJECTION INTRAVENOUS ONCE AS NEEDED
OUTPATIENT
Start: 2024-12-12

## 2024-12-12 RX ORDER — HEPARIN SODIUM,PORCINE/PF 10 UNIT/ML
50 SYRINGE (ML) INTRAVENOUS AS NEEDED
OUTPATIENT
Start: 2024-12-12

## 2024-12-12 RX ORDER — HEPARIN 100 UNIT/ML
500 SYRINGE INTRAVENOUS AS NEEDED
OUTPATIENT
Start: 2024-12-12

## 2024-12-12 ASSESSMENT — PAIN SCALES - GENERAL: PAINLEVEL_OUTOF10: 0-NO PAIN

## 2024-12-12 NOTE — PROGRESS NOTES
2 doses of IV feraheme ordered- needs precerted  Set up for 12/27 830 and 1/3 830- pt needs am appts  Labs at the hosp the week prior to his followup  Rtc 3/14 10am  for CNP visit  Reviewed AVS with patient- patient verbalizes understanding

## 2024-12-12 NOTE — PATIENT INSTRUCTIONS
Discussed and reviewed medical history   Reviewed recent labs- iron deficiency with anemia- iron parameters Iron Serum 11, Iron Saturation 3%, Ferritin 25 Hemoglobin 9.4, Platelets 456k, WBC stable at 5.2  Encouraged to continue on oral Iron daily, take with source of Vitamin C  Discussed ordering a coure of Ferraheme (IV Iron)

## 2024-12-12 NOTE — PROGRESS NOTES
Patient ID: Olayinka Melara is a 79 y.o. male.    Subjective    HPI    Chief Complaint: Evaluation for anemia   Interval History:    PCP : Dr. Aaron     Reason for referral: Anemia     HPI  75 year old man with pmh of BPH, COPD, kyphoscoliosis, Lumbosacral spondylosis, referred for anemia. He has chronic anemia and progressively dropping Hg 11-12 g/dl last year to 8.6 g/dl in Sep 2021 until recently he had a visit to ER on 10/22/21 for SoB where his Hg was 7.0 g/dl. MCV is 64, his ferritin was 10 , b12 and ferritin were normal last year. He was transfused 1 unit PRBC and told to follow hematology  He has been feeling more tired recently , he has chronic fatigue that worsened near the time he went to the emergency room   He has chronic SOB and DUARTE from COPD and actually was having some hemoptysis with blood tinged sputum that day . This has improved   He has occasionally seeing melena around once per month   He has chronic  epigastric pain and occasional nausea   He is not active and does not have energy to do much activities   Weight fluctuating but mostly same range of 112-118   He is no urinary complaints   No changes in hair or nails      EGD on 1/21/22 showed erosive gastropathy with stigmata of bleeding, biopsies showed healing ulcers , no malignancy      interval history - 12/12/24     Completed pulmonary rehab, reports that it helped   Increased O2 to 3 L NC      Energy has slightly improved, remains fatigued  SOB depends on the weather recently, increased to O2 3L NC  Remains to have increased DUARTE with stairs  Pulse Ox 96%, dropped to 70% a few times      Eating and drinking okay   Bruises easily   No abnormal bleeding events     Single episode of epitaxies, cleared up with saline nasal spray   No chest pain or pressure   No fever, chills, or infections   Occasional night sweats 2-3 times weekly     Chronic Productive cough, clear thick phlegm      No abdominal pain /cramping   No diarrhea or constipation    Frequent urination, no dysuria or hematuria     Occasional numbness or tingling in hands or feet  No new concerns        Objective    BSA: 1.56 meters squared  BP 83/50   Pulse 76   Temp 36.6 °C (97.9 °F) (Skin)   Resp 24   Wt 53.3 kg (117 lb 9.6 oz)   SpO2 99%   BMI 19.57 kg/m²      Physical Exam  Vitals and nursing note reviewed.   Constitutional:       Appearance: Normal appearance.   HENT:      Head: Normocephalic and atraumatic.      Nose: Nose normal.      Mouth/Throat:      Mouth: Mucous membranes are moist.      Pharynx: Oropharynx is clear.   Eyes:      Extraocular Movements: Extraocular movements intact.      Conjunctiva/sclera: Conjunctivae normal.   Cardiovascular:      Rate and Rhythm: Normal rate and regular rhythm.      Pulses: Normal pulses.      Heart sounds: Normal heart sounds.   Pulmonary:      Effort: Pulmonary effort is normal.      Breath sounds: Wheezing and rhonchi present.      Comments: Continuous oxygen at 3L NC   Abdominal:      Palpations: Abdomen is soft.      Tenderness: There is no abdominal tenderness.   Musculoskeletal:      Cervical back: Normal range of motion.   Skin:     General: Skin is warm and dry.      Coloration: Skin is pale.      Findings: Bruising present.   Neurological:      General: No focal deficit present.      Mental Status: He is alert and oriented to person, place, and time.      Motor: Weakness present.   Psychiatric:         Mood and Affect: Mood normal.         Behavior: Behavior normal.         Thought Content: Thought content normal.         Judgment: Judgment normal.         Performance Status:  Symptomatic; fully ambulatory      Assessment/Plan        1. Anemia   secondary to iron deficiency on a likely AOCD  s/p one unit PRBC transfusion in ER in Oct 2021      have received 2 course of IV iron   improved Hg now to 11.4 g/dl and sufficient iron stores      remaining labs normal/ negative      EGD showed evidence of upper GI bleed with erosive  gastropathy/ ulcer       12/10/24: Patient remains anemic with a Hg of 9.4. Iron parameters have trended down again, Iron Serum 11, Iron Saturation 3%, Ferritin 25. B12 is stable at 822. Platelets are mildly elevated likely secondary to his iron deficiency, hopeful they will correct with adequate replacement of his iron. Regardless will continue to monitor.  Patient is taking oral iron daily.  Discussed likely secondary to chronic disease, inflammatory processes and malabsorption. Recommendation is ordering a course of IV Iron to be administered upon insurance approval, in hopes to replace his iron. Continue oral iron and B12 daily. Will continue to monitor labs for efficacy of iron replacement.           2. COPD Severe (HCC)  -managed by Pulmonology  -dependent on supplemental oxygen 2-3L/min   Recent CT scan on 7/3/24 reported-diffuse bilateral bronchitis with scattered peripheral airway mucoid impactions, volume loss of the left upper lobe with left upper lobe/lingular bronchiectasis, background changes of significant emphysema, dilatation of the aortic root at 4.0 cm.  Atherosclerotic vascular disease and multivessel coronary artery calcifications and Scattered bilateral centrilobular tiny nodules/nodular clusters suggesting bronchiolitis or aspiration.  Follow-up suggested in 3 months, pulmonology is managing         Plan:  Encouraged to increase protein, intake- suggested boost, ensure or clear protein drink such as protein 2.0- offered nutritional consult- deferred at this time  Continue  oral iron to daily, wit source of Vitamin C, don't take iron with dairy products or anti-acids  Continue on oral B12 1000 mg daily  Order course of IV Iron to be administered upon insurance approval   Return to see APRN in 3 months - with labs prior (CBC w/diff, CMP, Ferritin, Iron, B12, Retic)         LUCIANA Zamora-CNP

## 2024-12-23 ENCOUNTER — TELEPHONE (OUTPATIENT)
Dept: PAIN MEDICINE | Facility: CLINIC | Age: 79
End: 2024-12-23
Payer: MEDICARE

## 2024-12-23 DIAGNOSIS — M48.062 NEUROGENIC CLAUDICATION DUE TO LUMBAR SPINAL STENOSIS: ICD-10-CM

## 2024-12-23 DIAGNOSIS — M54.12 CERVICAL RADICULITIS: ICD-10-CM

## 2024-12-23 DIAGNOSIS — G89.4 CHRONIC PAIN DISORDER: ICD-10-CM

## 2024-12-23 RX ORDER — TRAMADOL HYDROCHLORIDE 50 MG/1
50 TABLET ORAL 3 TIMES DAILY PRN
Qty: 90 TABLET | Refills: 0 | Status: SHIPPED | OUTPATIENT
Start: 2024-12-23

## 2024-12-27 ENCOUNTER — INFUSION (OUTPATIENT)
Dept: HEMATOLOGY/ONCOLOGY | Facility: CLINIC | Age: 79
End: 2024-12-27
Payer: MEDICARE

## 2024-12-27 VITALS
HEART RATE: 69 BPM | RESPIRATION RATE: 22 BRPM | DIASTOLIC BLOOD PRESSURE: 82 MMHG | SYSTOLIC BLOOD PRESSURE: 150 MMHG | TEMPERATURE: 97.3 F | WEIGHT: 113.76 LBS | OXYGEN SATURATION: 98 % | BODY MASS INDEX: 18.93 KG/M2

## 2024-12-27 DIAGNOSIS — E61.1 IRON DEFICIENCY: ICD-10-CM

## 2024-12-27 DIAGNOSIS — D63.8 ANEMIA OF CHRONIC DISEASE: ICD-10-CM

## 2024-12-27 PROCEDURE — 96365 THER/PROPH/DIAG IV INF INIT: CPT

## 2024-12-27 PROCEDURE — 2500000004 HC RX 250 GENERAL PHARMACY W/ HCPCS (ALT 636 FOR OP/ED): Mod: JZ

## 2024-12-27 RX ORDER — HEPARIN SODIUM,PORCINE/PF 10 UNIT/ML
50 SYRINGE (ML) INTRAVENOUS AS NEEDED
OUTPATIENT
Start: 2024-12-27

## 2024-12-27 RX ORDER — DIPHENHYDRAMINE HYDROCHLORIDE 50 MG/ML
50 INJECTION INTRAMUSCULAR; INTRAVENOUS AS NEEDED
OUTPATIENT
Start: 2025-01-03

## 2024-12-27 RX ORDER — ALBUTEROL SULFATE 0.83 MG/ML
3 SOLUTION RESPIRATORY (INHALATION) AS NEEDED
OUTPATIENT
Start: 2025-01-03

## 2024-12-27 RX ORDER — EPINEPHRINE 0.3 MG/.3ML
0.3 INJECTION SUBCUTANEOUS EVERY 5 MIN PRN
OUTPATIENT
Start: 2025-01-03

## 2024-12-27 RX ORDER — FAMOTIDINE 10 MG/ML
20 INJECTION INTRAVENOUS ONCE AS NEEDED
OUTPATIENT
Start: 2025-01-03

## 2024-12-27 RX ORDER — HEPARIN 100 UNIT/ML
500 SYRINGE INTRAVENOUS AS NEEDED
OUTPATIENT
Start: 2024-12-27

## 2024-12-27 ASSESSMENT — PAIN SCALES - GENERAL: PAINLEVEL_OUTOF10: 0-NO PAIN

## 2025-01-03 ENCOUNTER — INFUSION (OUTPATIENT)
Dept: HEMATOLOGY/ONCOLOGY | Facility: CLINIC | Age: 80
End: 2025-01-03
Payer: MEDICARE

## 2025-01-03 VITALS
TEMPERATURE: 96.8 F | OXYGEN SATURATION: 95 % | SYSTOLIC BLOOD PRESSURE: 142 MMHG | RESPIRATION RATE: 18 BRPM | BODY MASS INDEX: 18.82 KG/M2 | HEART RATE: 71 BPM | DIASTOLIC BLOOD PRESSURE: 80 MMHG | WEIGHT: 113.1 LBS

## 2025-01-03 DIAGNOSIS — D63.8 ANEMIA OF CHRONIC DISEASE: ICD-10-CM

## 2025-01-03 DIAGNOSIS — E61.1 IRON DEFICIENCY: ICD-10-CM

## 2025-01-03 PROCEDURE — 2500000004 HC RX 250 GENERAL PHARMACY W/ HCPCS (ALT 636 FOR OP/ED): Mod: JZ

## 2025-01-03 PROCEDURE — 96365 THER/PROPH/DIAG IV INF INIT: CPT

## 2025-01-03 RX ORDER — EPINEPHRINE 0.3 MG/.3ML
0.3 INJECTION SUBCUTANEOUS EVERY 5 MIN PRN
OUTPATIENT
Start: 2025-01-03

## 2025-01-03 RX ORDER — HEPARIN 100 UNIT/ML
500 SYRINGE INTRAVENOUS AS NEEDED
OUTPATIENT
Start: 2025-01-03

## 2025-01-03 RX ORDER — HEPARIN SODIUM,PORCINE/PF 10 UNIT/ML
50 SYRINGE (ML) INTRAVENOUS AS NEEDED
OUTPATIENT
Start: 2025-01-03

## 2025-01-03 RX ORDER — FAMOTIDINE 10 MG/ML
20 INJECTION INTRAVENOUS ONCE AS NEEDED
OUTPATIENT
Start: 2025-01-03

## 2025-01-03 RX ORDER — DIPHENHYDRAMINE HYDROCHLORIDE 50 MG/ML
50 INJECTION INTRAMUSCULAR; INTRAVENOUS AS NEEDED
OUTPATIENT
Start: 2025-01-03

## 2025-01-03 RX ORDER — ALBUTEROL SULFATE 0.83 MG/ML
3 SOLUTION RESPIRATORY (INHALATION) AS NEEDED
OUTPATIENT
Start: 2025-01-03

## 2025-01-03 RX ADMIN — FERUMOXYTOL 510 MG: 510 INJECTION INTRAVENOUS at 08:43

## 2025-01-03 ASSESSMENT — PAIN SCALES - GENERAL: PAINLEVEL_OUTOF10: 0-NO PAIN

## 2025-01-09 ENCOUNTER — OFFICE VISIT (OUTPATIENT)
Dept: PAIN MEDICINE | Facility: CLINIC | Age: 80
End: 2025-01-09
Payer: MEDICARE

## 2025-01-09 VITALS
RESPIRATION RATE: 20 BRPM | HEART RATE: 85 BPM | WEIGHT: 113 LBS | BODY MASS INDEX: 18.8 KG/M2 | DIASTOLIC BLOOD PRESSURE: 56 MMHG | SYSTOLIC BLOOD PRESSURE: 92 MMHG

## 2025-01-09 DIAGNOSIS — M54.17 LUMBOSACRAL RADICULOPATHY: ICD-10-CM

## 2025-01-09 DIAGNOSIS — M48.062 NEUROGENIC CLAUDICATION DUE TO LUMBAR SPINAL STENOSIS: ICD-10-CM

## 2025-01-09 DIAGNOSIS — G89.4 CHRONIC PAIN DISORDER: ICD-10-CM

## 2025-01-09 DIAGNOSIS — M47.27 OSTEOARTHRITIS OF SPINE WITH RADICULOPATHY, LUMBOSACRAL REGION: ICD-10-CM

## 2025-01-09 DIAGNOSIS — M54.16 LUMBAR RADICULOPATHY: Primary | ICD-10-CM

## 2025-01-09 PROCEDURE — 99214 OFFICE O/P EST MOD 30 MIN: CPT | Performed by: PHYSICIAN ASSISTANT

## 2025-01-09 RX ORDER — LIDOCAINE HYDROCHLORIDE 20 MG/ML
6 INJECTION, SOLUTION EPIDURAL; INFILTRATION; INTRACAUDAL; PERINEURAL ONCE
OUTPATIENT
Start: 2025-01-09 | End: 2025-01-09

## 2025-01-09 RX ORDER — SODIUM CHLORIDE 9 MG/ML
1 INJECTION, SOLUTION INTRAMUSCULAR; INTRAVENOUS; SUBCUTANEOUS ONCE
OUTPATIENT
Start: 2025-01-09 | End: 2025-01-09

## 2025-01-09 RX ORDER — BACILLUS COAGULANS 1B CELL
CAPSULE ORAL
COMMUNITY
Start: 2024-07-02

## 2025-01-09 RX ORDER — DEXAMETHASONE SODIUM PHOSPHATE 10 MG/ML
10 INJECTION INTRAMUSCULAR; INTRAVENOUS ONCE
OUTPATIENT
Start: 2025-01-09 | End: 2025-01-09

## 2025-01-09 RX ORDER — OMEPRAZOLE 20 MG/1
20 TABLET, DELAYED RELEASE ORAL
COMMUNITY
Start: 2024-07-02

## 2025-01-09 RX ORDER — ALBUTEROL SULFATE 5 MG/ML
SOLUTION RESPIRATORY (INHALATION)
COMMUNITY
Start: 2024-07-02

## 2025-01-09 RX ORDER — LIDOCAINE HYDROCHLORIDE 20 MG/ML
1 INJECTION, SOLUTION EPIDURAL; INFILTRATION; INTRACAUDAL; PERINEURAL ONCE
OUTPATIENT
Start: 2025-01-09 | End: 2025-01-09

## 2025-01-09 ASSESSMENT — ENCOUNTER SYMPTOMS
GASTROINTESTINAL NEGATIVE: 1
EYES NEGATIVE: 1
CARDIOVASCULAR NEGATIVE: 1
HEMATOLOGIC/LYMPHATIC NEGATIVE: 1
WEAKNESS: 1
ARTHRALGIAS: 1
RESPIRATORY NEGATIVE: 1
NUMBNESS: 1
PSYCHIATRIC NEGATIVE: 1
ALLERGIC/IMMUNOLOGIC NEGATIVE: 1
CONSTITUTIONAL NEGATIVE: 1
MYALGIAS: 1
ENDOCRINE NEGATIVE: 1

## 2025-01-09 NOTE — PROGRESS NOTES
Subjective   Patient ID: Olayinka Melara is a 79 y.o. male who presents for Med Management (PATIENT IS FOLLOWING UP ON TRAMADOL AND DICLOFENAC GEL PRN, HE TAKES THE TRAMADOL AND APPLIES DICLOFENAC FOR ONGOING LOWER BACK PAIN THAT HE SAYS IS A THROBBING PAIN THAT GOES INTO BOTH OF HIS LEG THE LEFT LEG MORE THAN THE RIGHT HE SAYS IT GOES TO HIS KNEES, HE HAS PAIN WITH STANDING,WALKING,TRANSITIONING, ADL, ).HE WILL SIT FOR RELIEF, HE IS ON HOME OXYGEN AND AMBULATES WITH A SEATED ROLATOR, PAIN SCORE 5/10, FALLS NO, DEP NO, SMOKING NO, THELMA=49%, COMM=2    Halle Donaldson, [facility] 01/09/25 8:51 AM     Patient is a 79-year-old male who presents today for a follow-up appointment for medication management.  He is using tramadol 50 mg 3 times a day.  He tolerates this well and it does give him some improvement.  He historically underwent a right sided L3-4 and L4-5 TFESI on/5/24.  He still feels as if he is getting at least moderate relief from this procedure.  Unfortunately he is having left sided leg pain.  Same pain pattern.  Same area.  He has a stabbing type discomfort as well as numbness and tingling.  This affects his ambulatory status.  This affects his quality of life.  This affects his activities.  Affects his ability to do things he wants to do.  He rates it a 5/10.  He has done therapy in the past that has not helped.  He has taken over-the-counter medications that have not helped.  The tramadol gives some improvement not enough and he wonders about having an injection on the left side to try to get some relief.        Review of Systems   Constitutional: Negative.    HENT: Negative.     Eyes: Negative.    Respiratory: Negative.     Cardiovascular: Negative.    Gastrointestinal: Negative.    Endocrine: Negative.    Genitourinary: Negative.    Musculoskeletal:  Positive for arthralgias, gait problem and myalgias.   Skin: Negative.    Allergic/Immunologic: Negative.    Neurological:  Positive for weakness and numbness.    Hematological: Negative.    Psychiatric/Behavioral: Negative.         Objective   Physical Exam  Vitals and nursing note reviewed.   Constitutional:       General: He is not in acute distress.     Appearance: Normal appearance. He is not ill-appearing.   HENT:      Head: Normocephalic and atraumatic.      Right Ear: External ear normal.      Left Ear: External ear normal.      Nose: Nose normal.      Mouth/Throat:      Pharynx: Oropharynx is clear.   Eyes:      Conjunctiva/sclera: Conjunctivae normal.   Cardiovascular:      Rate and Rhythm: Normal rate and regular rhythm.      Pulses: Normal pulses.   Pulmonary:      Effort: Pulmonary effort is normal.      Breath sounds: Normal breath sounds.   Musculoskeletal:         General: Normal range of motion.      Cervical back: Normal range of motion.      Comments: Ambulating with a walker  On oxygen  5/5 lower extremity strength other than left hip flexion, ADF and EHL 4+ to 5 -/5   Skin:     General: Skin is warm and dry.   Neurological:      General: No focal deficit present.      Mental Status: He is alert and oriented to person, place, and time. Mental status is at baseline.   Psychiatric:         Mood and Affect: Mood normal.         Behavior: Behavior normal.         Thought Content: Thought content normal.         Judgment: Judgment normal.       MR lumbar spine wo IV contrast  Status: Final result     PACS Images     Show images for MR lumbar spine wo IV contrast  Signed by    Signed Time Phone Pager   Dora Tejeda MD 7/31/2023 11:02 [phone]     Exam Information    Status Exam Begun Exam Ended   Final  7/31/2023 08:27     Study Result    Narrative   Interpreted By:  DORA TEJEDA MD  MRN: [id number]  Patient Name: MONY REDDING     STUDY:  MRI L-SPINE WO;  7/31/2023 8:27 am     INDICATION:  pain  M48.062: Neurogenic claudication due to lumbar spinal stenosis  M54.17: Lumbosacral radiculopathy.     COMPARISON:  MRI of lumbar spine from  07/09/2020     ACCESSION NUMBER(S):  [id number]     ORDERING CLINICIAN:  BREANNE KHANNA     TECHNIQUE:  Sagittal and axial T1 and T2 weighted images of the lumbar spine were  acquired. Sagittal STIR imaging was also performed     FINDINGS:  There is right convex scoliosis of the lumbar spine with apex at L2.     The vertebral bodies demonstrate expected height. There are chronic  degenerative marrow signal changes at multiple levels. There is a  small focus of T1 hypointense and T2 hyperintense signal within left  aspect of sacrum S1 segment which appears slightly more pronounced as  compared to prior study. There is minimal T1 hyperintensity within  the center of this focus which likely reflects an atypical hemangioma.     The lower thoracic cord is unremarkable. The conus terminates  appropriately at L1. There is fatty infiltration of the filum  terminale as before.     There is desiccation and degeneration of several intervertebral discs  with multilevel anterior and posterior osteophytic spurring.     At L5-S1 there is prominent posterior osteophytic spurring and  bilateral facet hypertrophy. There is narrowing of right lateral  recess with mild overall central canal stenosis. There is moderate  right and minimal left neural foraminal narrowing, slightly worse as  compared to prior study.     At L4-L5 there is circumferential disc bulge with bilateral facet  hypertrophy and ligamentum flavum thickening. There is narrowing of  right lateral recess with mild overall central canal stenosis. There  is moderate to severe right and mild left neural foraminal narrowing.  The neural foramina appears slightly worse as compared to prior study.     At L3-L4 there is circumferential disc bulge with prominent posterior  osteophytic spurring and bilateral facet hypertrophy. There is  narrowing of bilateral lateral recesses with moderate to severe  central canal stenosis and severe right and moderate to severe left  neural  foraminal narrowing. The findings have worsened since prior  study.     At L2-L3, there is prominent posterior osteophytic spurring with  bilateral facet hypertrophy and ligamentum flavum thickening. There  is moderate central canal stenosis with mild right and moderate to  severe left neural foraminal narrowing. The overall findings appear  slightly worse as compared to prior study.     At L1-L2 there is posterior osteophytic spurring asymmetric to the  left with bilateral facet and ligamentum flavum hypertrophy. There is  narrowing of left lateral recess without overall central canal  stenosis. There is moderate left neural foraminal narrowing. The  findings are similar to prior study.     There is asymmetry in volume of bilateral anterior and posterior  paraspinous muscles.      Impression   Lumbar spondylosis in the setting of right convex scoliosis affecting  multiple levels as detailed. Several of the findings have slightly  worsened as compared to prior MRI from 07/09/2020.     Assessment/Plan   Diagnoses and all orders for this visit:  Lumbar radiculopathy  -     Transforaminal; Future  -     FL pain management; Future  Lumbosacral radiculopathy  Osteoarthritis of spine with radiculopathy, lumbosacral region  Chronic pain disorder  Neurogenic claudication due to lumbar spinal stenosis  Other orders  -     NPO Diet Except: Sips with meds; Effective now; Standing  -     Height and weight; Standing  -     Insert and maintain peripheral IV; Standing  -     Saline lock IV; Standing  -     Type And Screen; Standing  -     Inpatient consult to Respiratory Care; Standing  -     Adult diet Regular; Standing  -     Vital Signs; Standing  -     Notify physician - Standard Parameters; Standing  -     Continue IV fluids ordered pre-procedure; Standing  -     Prior to Discharge O2 Weaning; Standing  -     Pulse oximetry, continuous; Standing  -     Discharge patient; Standing  -     iohexol (OMNIPaque) 300 mg iodine/mL  solution 6 mL  -     lidocaine PF (Xylocaine) 20 mg/mL (2 %) injection 120 mg  -     lidocaine PF (Xylocaine) 20 mg/mL (2 %) injection 20 mg  -     sodium chloride (PF) 0.9% solution 1 mL  -     dexAMETHasone (PF) (Decadron) injection 10 mg       Patient is a 79-year-old male with a past medical history significant for the above-mentioned medical diagnoses.  He is following up today for med management follow-up.  He uses the tramadolSparingly for pain.  He is not require refill.  He will call when he does.  [place] was reviewed and is appropriate.  Most recent UDS was reviewed and is appropriate.  Narcan has previously been offered and declined.  At this time, he has the same pain that he had on the right side just on the left side.  We reviewed the MRI.  Based on his MRI findings, his pain pattern and his failure to improve with conservative treatments as well as the significant left-sided leg pain he is experiencing I recommended to patient a left-sided L3-4 and L4-5 transforaminal epidural steroid injection to be done under fluoroscopy for both diagnostic and therapeutic purposes.  Procedure was discussed.  Risk and benefits were discussed.  Medication hold including vitamins for 1 week was discussed.  He will follow-up 2 weeks after the injection for reevaluation.  Call clinic sooner if necessary.

## 2025-01-09 NOTE — H&P (VIEW-ONLY)
Subjective   Patient ID: Olayinka Melara is a 79 y.o. male who presents for Med Management (PATIENT IS FOLLOWING UP ON TRAMADOL AND DICLOFENAC GEL PRN, HE TAKES THE TRAMADOL AND APPLIES DICLOFENAC FOR ONGOING LOWER BACK PAIN THAT HE SAYS IS A THROBBING PAIN THAT GOES INTO BOTH OF HIS LEG THE LEFT LEG MORE THAN THE RIGHT HE SAYS IT GOES TO HIS KNEES, HE HAS PAIN WITH STANDING,WALKING,TRANSITIONING, ADL, ).HE WILL SIT FOR RELIEF, HE IS ON HOME OXYGEN AND AMBULATES WITH A SEATED ROLATOR, PAIN SCORE 5/10, FALLS NO, DEP NO, SMOKING NO, THELMA=49%, COMM=2    Halle Donaldson, [facility] 01/09/25 8:51 AM     Patient is a 79-year-old male who presents today for a follow-up appointment for medication management.  He is using tramadol 50 mg 3 times a day.  He tolerates this well and it does give him some improvement.  He historically underwent a right sided L3-4 and L4-5 TFESI on/5/24.  He still feels as if he is getting at least moderate relief from this procedure.  Unfortunately he is having left sided leg pain.  Same pain pattern.  Same area.  He has a stabbing type discomfort as well as numbness and tingling.  This affects his ambulatory status.  This affects his quality of life.  This affects his activities.  Affects his ability to do things he wants to do.  He rates it a 5/10.  He has done therapy in the past that has not helped.  He has taken over-the-counter medications that have not helped.  The tramadol gives some improvement not enough and he wonders about having an injection on the left side to try to get some relief.        Review of Systems   Constitutional: Negative.    HENT: Negative.     Eyes: Negative.    Respiratory: Negative.     Cardiovascular: Negative.    Gastrointestinal: Negative.    Endocrine: Negative.    Genitourinary: Negative.    Musculoskeletal:  Positive for arthralgias, gait problem and myalgias.   Skin: Negative.    Allergic/Immunologic: Negative.    Neurological:  Positive for weakness and numbness.    Hematological: Negative.    Psychiatric/Behavioral: Negative.         Objective   Physical Exam  Vitals and nursing note reviewed.   Constitutional:       General: He is not in acute distress.     Appearance: Normal appearance. He is not ill-appearing.   HENT:      Head: Normocephalic and atraumatic.      Right Ear: External ear normal.      Left Ear: External ear normal.      Nose: Nose normal.      Mouth/Throat:      Pharynx: Oropharynx is clear.   Eyes:      Conjunctiva/sclera: Conjunctivae normal.   Cardiovascular:      Rate and Rhythm: Normal rate and regular rhythm.      Pulses: Normal pulses.   Pulmonary:      Effort: Pulmonary effort is normal.      Breath sounds: Normal breath sounds.   Musculoskeletal:         General: Normal range of motion.      Cervical back: Normal range of motion.      Comments: Ambulating with a walker  On oxygen  5/5 lower extremity strength other than left hip flexion, ADF and EHL 4+ to 5 -/5   Skin:     General: Skin is warm and dry.   Neurological:      General: No focal deficit present.      Mental Status: He is alert and oriented to person, place, and time. Mental status is at baseline.   Psychiatric:         Mood and Affect: Mood normal.         Behavior: Behavior normal.         Thought Content: Thought content normal.         Judgment: Judgment normal.       MR lumbar spine wo IV contrast  Status: Final result     PACS Images     Show images for MR lumbar spine wo IV contrast  Signed by    Signed Time Phone Pager   Dora Tejeda MD 7/31/2023 11:02 [phone]     Exam Information    Status Exam Begun Exam Ended   Final  7/31/2023 08:27     Study Result    Narrative   Interpreted By:  DORA TEJEDA MD  MRN: [id number]  Patient Name: MONY REDDING     STUDY:  MRI L-SPINE WO;  7/31/2023 8:27 am     INDICATION:  pain  M48.062: Neurogenic claudication due to lumbar spinal stenosis  M54.17: Lumbosacral radiculopathy.     COMPARISON:  MRI of lumbar spine from  07/09/2020     ACCESSION NUMBER(S):  [id number]     ORDERING CLINICIAN:  BREANNE KHANNA     TECHNIQUE:  Sagittal and axial T1 and T2 weighted images of the lumbar spine were  acquired. Sagittal STIR imaging was also performed     FINDINGS:  There is right convex scoliosis of the lumbar spine with apex at L2.     The vertebral bodies demonstrate expected height. There are chronic  degenerative marrow signal changes at multiple levels. There is a  small focus of T1 hypointense and T2 hyperintense signal within left  aspect of sacrum S1 segment which appears slightly more pronounced as  compared to prior study. There is minimal T1 hyperintensity within  the center of this focus which likely reflects an atypical hemangioma.     The lower thoracic cord is unremarkable. The conus terminates  appropriately at L1. There is fatty infiltration of the filum  terminale as before.     There is desiccation and degeneration of several intervertebral discs  with multilevel anterior and posterior osteophytic spurring.     At L5-S1 there is prominent posterior osteophytic spurring and  bilateral facet hypertrophy. There is narrowing of right lateral  recess with mild overall central canal stenosis. There is moderate  right and minimal left neural foraminal narrowing, slightly worse as  compared to prior study.     At L4-L5 there is circumferential disc bulge with bilateral facet  hypertrophy and ligamentum flavum thickening. There is narrowing of  right lateral recess with mild overall central canal stenosis. There  is moderate to severe right and mild left neural foraminal narrowing.  The neural foramina appears slightly worse as compared to prior study.     At L3-L4 there is circumferential disc bulge with prominent posterior  osteophytic spurring and bilateral facet hypertrophy. There is  narrowing of bilateral lateral recesses with moderate to severe  central canal stenosis and severe right and moderate to severe left  neural  foraminal narrowing. The findings have worsened since prior  study.     At L2-L3, there is prominent posterior osteophytic spurring with  bilateral facet hypertrophy and ligamentum flavum thickening. There  is moderate central canal stenosis with mild right and moderate to  severe left neural foraminal narrowing. The overall findings appear  slightly worse as compared to prior study.     At L1-L2 there is posterior osteophytic spurring asymmetric to the  left with bilateral facet and ligamentum flavum hypertrophy. There is  narrowing of left lateral recess without overall central canal  stenosis. There is moderate left neural foraminal narrowing. The  findings are similar to prior study.     There is asymmetry in volume of bilateral anterior and posterior  paraspinous muscles.      Impression   Lumbar spondylosis in the setting of right convex scoliosis affecting  multiple levels as detailed. Several of the findings have slightly  worsened as compared to prior MRI from 07/09/2020.     Assessment/Plan   Diagnoses and all orders for this visit:  Lumbar radiculopathy  -     Transforaminal; Future  -     FL pain management; Future  Lumbosacral radiculopathy  Osteoarthritis of spine with radiculopathy, lumbosacral region  Chronic pain disorder  Neurogenic claudication due to lumbar spinal stenosis  Other orders  -     NPO Diet Except: Sips with meds; Effective now; Standing  -     Height and weight; Standing  -     Insert and maintain peripheral IV; Standing  -     Saline lock IV; Standing  -     Type And Screen; Standing  -     Inpatient consult to Respiratory Care; Standing  -     Adult diet Regular; Standing  -     Vital Signs; Standing  -     Notify physician - Standard Parameters; Standing  -     Continue IV fluids ordered pre-procedure; Standing  -     Prior to Discharge O2 Weaning; Standing  -     Pulse oximetry, continuous; Standing  -     Discharge patient; Standing  -     iohexol (OMNIPaque) 300 mg iodine/mL  solution 6 mL  -     lidocaine PF (Xylocaine) 20 mg/mL (2 %) injection 120 mg  -     lidocaine PF (Xylocaine) 20 mg/mL (2 %) injection 20 mg  -     sodium chloride (PF) 0.9% solution 1 mL  -     dexAMETHasone (PF) (Decadron) injection 10 mg       Patient is a 79-year-old male with a past medical history significant for the above-mentioned medical diagnoses.  He is following up today for med management follow-up.  He uses the tramadolSparingly for pain.  He is not require refill.  He will call when he does.  OARRS was reviewed and is appropriate.  Most recent UDS was reviewed and is appropriate.  Narcan has previously been offered and declined.  At this time, he has the same pain that he had on the right side just on the left side.  We reviewed the MRI.  Based on his MRI findings, his pain pattern and his failure to improve with conservative treatments as well as the significant left-sided leg pain he is experiencing I recommended to patient a left-sided L3-4 and L4-5 transforaminal epidural steroid injection to be done under fluoroscopy for both diagnostic and therapeutic purposes.  Procedure was discussed.  Risk and benefits were discussed.  Medication hold including vitamins for 1 week was discussed.  He will follow-up 2 weeks after the injection for reevaluation.  Call clinic sooner if necessary.

## 2025-01-10 ENCOUNTER — TELEPHONE (OUTPATIENT)
Dept: PAIN MEDICINE | Facility: CLINIC | Age: 80
End: 2025-01-10
Payer: MEDICARE

## 2025-01-10 NOTE — TELEPHONE ENCOUNTER
LEFT L3-L4 AND L4-L5 TFESI CPT 99653/20353 DX M54.16  APPROVED AUTH#[id number] VALID 2/7/2025-2/20/2025  
Bronchoscopy with transbronchial biopsy and bronchoalveolar lavage

## 2025-01-27 ENCOUNTER — TELEPHONE (OUTPATIENT)
Dept: PAIN MEDICINE | Facility: CLINIC | Age: 80
End: 2025-01-27
Payer: MEDICARE

## 2025-01-27 DIAGNOSIS — M48.062 NEUROGENIC CLAUDICATION DUE TO LUMBAR SPINAL STENOSIS: ICD-10-CM

## 2025-01-27 DIAGNOSIS — G89.4 CHRONIC PAIN DISORDER: ICD-10-CM

## 2025-01-27 DIAGNOSIS — M54.12 CERVICAL RADICULITIS: ICD-10-CM

## 2025-01-27 RX ORDER — TRAMADOL HYDROCHLORIDE 50 MG/1
50 TABLET ORAL 3 TIMES DAILY PRN
Qty: 90 TABLET | Refills: 0 | Status: SHIPPED | OUTPATIENT
Start: 2025-01-27

## 2025-02-07 ENCOUNTER — HOSPITAL ENCOUNTER (OUTPATIENT)
Dept: OPERATING ROOM | Facility: HOSPITAL | Age: 80
Setting detail: OUTPATIENT SURGERY
Discharge: HOME | End: 2025-02-07
Payer: MEDICARE

## 2025-02-07 VITALS
RESPIRATION RATE: 20 BRPM | SYSTOLIC BLOOD PRESSURE: 130 MMHG | DIASTOLIC BLOOD PRESSURE: 87 MMHG | HEIGHT: 65 IN | TEMPERATURE: 98.7 F | BODY MASS INDEX: 19.1 KG/M2 | WEIGHT: 114.64 LBS | HEART RATE: 76 BPM | OXYGEN SATURATION: 100 %

## 2025-02-07 DIAGNOSIS — M54.16 LUMBAR RADICULOPATHY: ICD-10-CM

## 2025-02-07 PROCEDURE — 64483 NJX AA&/STRD TFRM EPI L/S 1: CPT | Mod: LT | Performed by: STUDENT IN AN ORGANIZED HEALTH CARE EDUCATION/TRAINING PROGRAM

## 2025-02-07 PROCEDURE — 2500000004 HC RX 250 GENERAL PHARMACY W/ HCPCS (ALT 636 FOR OP/ED): Performed by: STUDENT IN AN ORGANIZED HEALTH CARE EDUCATION/TRAINING PROGRAM

## 2025-02-07 PROCEDURE — 2550000001 HC RX 255 CONTRASTS: Performed by: STUDENT IN AN ORGANIZED HEALTH CARE EDUCATION/TRAINING PROGRAM

## 2025-02-07 RX ORDER — LIDOCAINE HYDROCHLORIDE 20 MG/ML
INJECTION, SOLUTION EPIDURAL; INFILTRATION; INTRACAUDAL; PERINEURAL AS NEEDED
Status: COMPLETED | OUTPATIENT
Start: 2025-02-07 | End: 2025-02-07

## 2025-02-07 RX ORDER — DEXAMETHASONE SODIUM PHOSPHATE 10 MG/ML
INJECTION INTRAMUSCULAR; INTRAVENOUS AS NEEDED
Status: COMPLETED | OUTPATIENT
Start: 2025-02-07 | End: 2025-02-07

## 2025-02-07 RX ORDER — LIDOCAINE HYDROCHLORIDE 5 MG/ML
INJECTION, SOLUTION INFILTRATION; INTRAVENOUS AS NEEDED
Status: COMPLETED | OUTPATIENT
Start: 2025-02-07 | End: 2025-02-07

## 2025-02-07 RX ADMIN — DEXAMETHASONE SODIUM PHOSPHATE 10 MG: 10 INJECTION, SOLUTION INTRAMUSCULAR; INTRAVENOUS at 12:19

## 2025-02-07 RX ADMIN — LIDOCAINE HYDROCHLORIDE 2 ML: 5 INJECTION, SOLUTION INFILTRATION at 12:18

## 2025-02-07 RX ADMIN — LIDOCAINE HYDROCHLORIDE 2 ML: 20 INJECTION, SOLUTION EPIDURAL; INFILTRATION; INTRACAUDAL; PERINEURAL at 12:16

## 2025-02-07 RX ADMIN — IOHEXOL 2 ML: 300 INJECTION, SOLUTION INTRAVENOUS at 12:17

## 2025-02-07 ASSESSMENT — PAIN DESCRIPTION - DESCRIPTORS: DESCRIPTORS: ACHING;SHARP

## 2025-02-07 ASSESSMENT — PAIN SCALES - GENERAL
PAINLEVEL_OUTOF10: 0 - NO PAIN
PAINLEVEL_OUTOF10: 6

## 2025-02-07 ASSESSMENT — PAIN - FUNCTIONAL ASSESSMENT
PAIN_FUNCTIONAL_ASSESSMENT: 0-10
PAIN_FUNCTIONAL_ASSESSMENT: 0-10

## 2025-02-07 ASSESSMENT — ENCOUNTER SYMPTOMS
DEPRESSION: 0
OCCASIONAL FEELINGS OF UNSTEADINESS: 0
LOSS OF SENSATION IN FEET: 0

## 2025-02-07 NOTE — PERIOPERATIVE NURSING NOTE
Discharge instructions reviewed by Sondra LUTHER RN no questions and verbalized understanding.   discharged amb  with rolator walker steady gait, to exit   to be driven home by joe Felix

## 2025-02-24 ENCOUNTER — TELEPHONE (OUTPATIENT)
Dept: PAIN MEDICINE | Facility: CLINIC | Age: 80
End: 2025-02-24
Payer: MEDICARE

## 2025-02-24 DIAGNOSIS — G89.4 CHRONIC PAIN DISORDER: ICD-10-CM

## 2025-02-24 DIAGNOSIS — M48.062 NEUROGENIC CLAUDICATION DUE TO LUMBAR SPINAL STENOSIS: ICD-10-CM

## 2025-02-24 DIAGNOSIS — M54.12 CERVICAL RADICULITIS: ICD-10-CM

## 2025-02-24 RX ORDER — TRAMADOL HYDROCHLORIDE 50 MG/1
50 TABLET ORAL 3 TIMES DAILY PRN
Qty: 90 TABLET | Refills: 0 | Status: SHIPPED | OUTPATIENT
Start: 2025-02-28

## 2025-03-07 ENCOUNTER — LAB (OUTPATIENT)
Dept: LAB | Facility: HOSPITAL | Age: 80
End: 2025-03-07
Payer: MEDICARE

## 2025-03-07 DIAGNOSIS — D63.8 ANEMIA OF CHRONIC DISEASE: ICD-10-CM

## 2025-03-07 DIAGNOSIS — E61.1 IRON DEFICIENCY: ICD-10-CM

## 2025-03-07 DIAGNOSIS — N40.1 BENIGN PROSTATIC HYPERPLASIA WITH NOCTURIA: ICD-10-CM

## 2025-03-07 DIAGNOSIS — R35.1 BENIGN PROSTATIC HYPERPLASIA WITH NOCTURIA: ICD-10-CM

## 2025-03-07 LAB
ALBUMIN SERPL BCP-MCNC: 3.8 G/DL (ref 3.4–5)
ALP SERPL-CCNC: 81 U/L (ref 33–136)
ALT SERPL W P-5'-P-CCNC: 5 U/L (ref 10–52)
ANION GAP SERPL CALC-SCNC: 11 MMOL/L (ref 10–20)
AST SERPL W P-5'-P-CCNC: 9 U/L (ref 9–39)
BASOPHILS # BLD AUTO: 0.03 X10*3/UL (ref 0–0.1)
BASOPHILS NFR BLD AUTO: 0.6 %
BILIRUB SERPL-MCNC: 0.8 MG/DL (ref 0–1.2)
BUN SERPL-MCNC: 9 MG/DL (ref 6–23)
CALCIUM SERPL-MCNC: 8.6 MG/DL (ref 8.6–10.3)
CHLORIDE SERPL-SCNC: 98 MMOL/L (ref 98–107)
CO2 SERPL-SCNC: 31 MMOL/L (ref 21–32)
CREAT SERPL-MCNC: 0.55 MG/DL (ref 0.5–1.3)
EGFRCR SERPLBLD CKD-EPI 2021: >90 ML/MIN/1.73M*2
EOSINOPHIL # BLD AUTO: 0.06 X10*3/UL (ref 0–0.4)
EOSINOPHIL NFR BLD AUTO: 1.1 %
ERYTHROCYTE [DISTWIDTH] IN BLOOD BY AUTOMATED COUNT: 19.6 % (ref 11.5–14.5)
FERRITIN SERPL-MCNC: 91 NG/ML (ref 20–300)
GLUCOSE SERPL-MCNC: 80 MG/DL (ref 74–99)
HCT VFR BLD AUTO: 30.5 % (ref 41–52)
HGB BLD-MCNC: 9.5 G/DL (ref 13.5–17.5)
HGB RETIC QN: 30 PG (ref 28–38)
IMM GRANULOCYTES # BLD AUTO: 0.03 X10*3/UL (ref 0–0.5)
IMM GRANULOCYTES NFR BLD AUTO: 0.6 % (ref 0–0.9)
IMMATURE RETIC FRACTION: 13.2 %
IRON SATN MFR SERPL: 9 % (ref 25–45)
IRON SERPL-MCNC: 23 UG/DL (ref 35–150)
LYMPHOCYTES # BLD AUTO: 1.07 X10*3/UL (ref 0.8–3)
LYMPHOCYTES NFR BLD AUTO: 20.3 %
MCH RBC QN AUTO: 27.6 PG (ref 26–34)
MCHC RBC AUTO-ENTMCNC: 31.1 G/DL (ref 32–36)
MCV RBC AUTO: 89 FL (ref 80–100)
MONOCYTES # BLD AUTO: 0.57 X10*3/UL (ref 0.05–0.8)
MONOCYTES NFR BLD AUTO: 10.8 %
NEUTROPHILS # BLD AUTO: 3.5 X10*3/UL (ref 1.6–5.5)
NEUTROPHILS NFR BLD AUTO: 66.6 %
NRBC BLD-RTO: 0 /100 WBCS (ref 0–0)
PLATELET # BLD AUTO: 413 X10*3/UL (ref 150–450)
POTASSIUM SERPL-SCNC: 4.1 MMOL/L (ref 3.5–5.3)
PROT SERPL-MCNC: 5.8 G/DL (ref 6.4–8.2)
RBC # BLD AUTO: 3.44 X10*6/UL (ref 4.5–5.9)
RETICS #: 0.08 X10*6/UL (ref 0.02–0.11)
RETICS/RBC NFR AUTO: 2.2 % (ref 0.5–2)
SODIUM SERPL-SCNC: 136 MMOL/L (ref 136–145)
TIBC SERPL-MCNC: 259 UG/DL (ref 240–445)
UIBC SERPL-MCNC: 236 UG/DL (ref 110–370)
WBC # BLD AUTO: 5.3 X10*3/UL (ref 4.4–11.3)

## 2025-03-07 PROCEDURE — 82728 ASSAY OF FERRITIN: CPT

## 2025-03-07 PROCEDURE — 83550 IRON BINDING TEST: CPT

## 2025-03-07 PROCEDURE — 83540 ASSAY OF IRON: CPT

## 2025-03-07 PROCEDURE — 82607 VITAMIN B-12: CPT

## 2025-03-07 PROCEDURE — 80053 COMPREHEN METABOLIC PANEL: CPT

## 2025-03-07 PROCEDURE — 85045 AUTOMATED RETICULOCYTE COUNT: CPT

## 2025-03-07 PROCEDURE — 85025 COMPLETE CBC W/AUTO DIFF WBC: CPT

## 2025-03-08 LAB — VIT B12 SERPL-MCNC: 773 PG/ML (ref 211–911)

## 2025-03-11 RX ORDER — TAMSULOSIN HYDROCHLORIDE 0.4 MG/1
0.4 CAPSULE ORAL DAILY
Qty: 90 CAPSULE | Refills: 3 | Status: SHIPPED | OUTPATIENT
Start: 2025-03-11 | End: 2026-03-11

## 2025-03-11 RX ORDER — TAMSULOSIN HYDROCHLORIDE 0.4 MG/1
0.4 CAPSULE ORAL DAILY
COMMUNITY
End: 2025-03-11 | Stop reason: SDUPTHER

## 2025-03-14 ENCOUNTER — OFFICE VISIT (OUTPATIENT)
Dept: HEMATOLOGY/ONCOLOGY | Facility: CLINIC | Age: 80
End: 2025-03-14
Payer: MEDICARE

## 2025-03-14 VITALS
RESPIRATION RATE: 20 BRPM | WEIGHT: 110 LBS | OXYGEN SATURATION: 97 % | HEART RATE: 70 BPM | DIASTOLIC BLOOD PRESSURE: 75 MMHG | TEMPERATURE: 97.2 F | SYSTOLIC BLOOD PRESSURE: 145 MMHG | BODY MASS INDEX: 18.3 KG/M2

## 2025-03-14 DIAGNOSIS — E43 UNSPECIFIED SEVERE PROTEIN-CALORIE MALNUTRITION (MULTI): ICD-10-CM

## 2025-03-14 DIAGNOSIS — D63.8 ANEMIA OF CHRONIC DISEASE: Primary | ICD-10-CM

## 2025-03-14 DIAGNOSIS — E61.1 IRON DEFICIENCY: ICD-10-CM

## 2025-03-14 PROCEDURE — 1157F ADVNC CARE PLAN IN RCRD: CPT

## 2025-03-14 PROCEDURE — 99214 OFFICE O/P EST MOD 30 MIN: CPT

## 2025-03-14 PROCEDURE — 1159F MED LIST DOCD IN RCRD: CPT

## 2025-03-14 PROCEDURE — 1126F AMNT PAIN NOTED NONE PRSNT: CPT

## 2025-03-14 RX ORDER — DIPHENHYDRAMINE HYDROCHLORIDE 50 MG/ML
50 INJECTION INTRAMUSCULAR; INTRAVENOUS AS NEEDED
OUTPATIENT
Start: 2025-03-14

## 2025-03-14 RX ORDER — EPINEPHRINE 0.3 MG/.3ML
0.3 INJECTION SUBCUTANEOUS EVERY 5 MIN PRN
OUTPATIENT
Start: 2025-03-14

## 2025-03-14 RX ORDER — FAMOTIDINE 10 MG/ML
20 INJECTION, SOLUTION INTRAVENOUS ONCE AS NEEDED
OUTPATIENT
Start: 2025-03-14

## 2025-03-14 RX ORDER — ALBUTEROL SULFATE 0.83 MG/ML
3 SOLUTION RESPIRATORY (INHALATION) AS NEEDED
OUTPATIENT
Start: 2025-03-14

## 2025-03-14 ASSESSMENT — PAIN SCALES - GENERAL: PAINLEVEL_OUTOF10: 0-NO PAIN

## 2025-03-14 NOTE — PROGRESS NOTES
Pt to get 2 doses of IV feraheme- I will bryn his caretaker Angela to schedule once approved- will need labs also with IV starcopy of AVS given to Angela

## 2025-03-14 NOTE — PATIENT INSTRUCTIONS
Discussed and reviewed medical history  Reviewed labs- Mild Anemia- hemoglobin remains 9.5, Iron Parameters- Iron Serum 23, Iron Saturation 9%, Ferritin 91  Encouraged to continue on oral iron daily, take in the evening (don't take with omeprazole  Ordered course of IV Iron to be infused upon insurance approval  Labs with IV Start at infusion center (Vitamin D, TSH, Folate)  Repeat labs 4 weeks post iron infusions  Return to see APRN 3 months with labs prior     (CBC w/diff, CMP, Ferritin, Iron Panel, Retic, B12)

## 2025-03-14 NOTE — PROGRESS NOTES
Patient ID: Olayinka Melara is a 79 y.o. male.    Subjective    HPI  HPI     Chief Complaint: Evaluation for anemia   Interval History:    PCP : Dr. Aaron     Reason for referral: Anemia     HPI  75 year old man with pmh of BPH, COPD, kyphoscoliosis, Lumbosacral spondylosis, referred for anemia. He has chronic anemia and progressively dropping Hg 11-12 g/dl last year to 8.6 g/dl in Sep 2021 until recently he had a visit to ER on 10/22/21 for SoB where his Hg was 7.0 g/dl. MCV is 64, his ferritin was 10 , b12 and ferritin were normal last year. He was transfused 1 unit PRBC and told to follow hematology  He has been feeling more tired recently , he has chronic fatigue that worsened near the time he went to the emergency room   He has chronic SOB and DUARTE from COPD and actually was having some hemoptysis with blood tinged sputum that day . This has improved   He has occasionally seeing melena around once per month   He has chronic  epigastric pain and occasional nausea   He is not active and does not have energy to do much activities   Weight fluctuating but mostly same range of 112-118   He is no urinary complaints   No changes in hair or nails      EGD on 1/21/22 showed erosive gastropathy with stigmata of bleeding, biopsies showed healing ulcers , no malignancy      interval history - 3/14/25     Wife remains at [place]    Energy remains on the lower side, remains fatigued  Falls asleep multiple times during the day watching TV  Denies any fevers or recent infections    SOB depends on the weather recently, increased to O2 3L [place]  Pulse Ox 96%, drops to 80% when walking        Eating and drinking okay   Bruises easily   No abnormal bleeding, no nose,gum bleeds, hematuria, hematochezia      Single episode of epitaxies, cleared up with saline nasal spray   No chest pain or pressure   No fever, chills, or infections   Occasional night sweats 2 times weekly     Chronic Productive cough, clear thick or green phlegm       No abdominal pain /cramping   No diarrhea or constipation   Frequent urination, no dysuria or hematuria     Occasional numbness or tingling in hands or feet  No new concerns    Objective    BSA: There is no height or weight on file to calculate BSA.  There were no vitals taken for this visit.     Physical Exam  Vitals and nursing note reviewed.   Constitutional:       Appearance: Normal appearance. He is underweight.      Comments: Uses walker    HENT:      Head: Normocephalic and atraumatic.      Nose: Nose normal.      Mouth/Throat:      Mouth: Mucous membranes are moist.      Pharynx: Oropharynx is clear.   Eyes:      General: No scleral icterus.     Extraocular Movements: Extraocular movements intact.      Conjunctiva/sclera: Conjunctivae normal.   Cardiovascular:      Rate and Rhythm: Normal rate and regular rhythm.      Pulses: Normal pulses.      Heart sounds: Normal heart sounds.   Pulmonary:      Effort: Pulmonary effort is normal.      Breath sounds: Rhonchi present.      Comments: Continuous home O2 3L nc  Abdominal:      Palpations: Abdomen is soft. There is no mass.      Tenderness: There is no abdominal tenderness.   Musculoskeletal:         General: Normal range of motion.      Cervical back: Normal range of motion.   Skin:     General: Skin is warm and dry.      Coloration: Skin is ashen.   Neurological:      General: No focal deficit present.      Mental Status: He is alert and oriented to person, place, and time.      Motor: Weakness present.      Gait: Gait abnormal.   Psychiatric:         Mood and Affect: Mood normal.         Behavior: Behavior normal.         Thought Content: Thought content normal.         Judgment: Judgment normal.         Performance Status:  Symptomatic; fully ambulatory      Assessment/Plan        1. Anemia   secondary to iron deficiency on a likely AOCD  s/p one unit PRBC transfusion in ER in Oct 2021      have received 2 course of IV iron   improved Hg now to 11.4 g/dl  and sufficient iron stores      remaining labs normal/ negative      EGD showed evidence of upper GI bleed with erosive gastropathy/ ulcer      3/14/25: Mildly Anemic with a Hg of 9.5. Iron parameters remain low, Iron Serum 23, Iron Saturation 9%, Ferritin 91. B12 is stable at 773. Regardless will continue to monitor.  Patient is taking oral iron daily.  Discussed likely secondary to chronic disease, inflammatory processes and malabsorption. Recommendation is ordering a course of IV Iron to be administered upon insurance approval, in hopes to replace his iron. Continue oral iron and B12 daily. Will continue to monitor labs for efficacy of iron replacement.           2. COPD Severe (HCC)  -managed by Pulmonology  -dependent on supplemental oxygen 2-3L/min   Recent CT scan on 7/3/24 reported-diffuse bilateral bronchitis with scattered peripheral airway mucoid impactions, volume loss of the left upper lobe with left upper lobe/lingular bronchiectasis, background changes of significant emphysema, dilatation of the aortic root at 4.0 cm.  Atherosclerotic vascular disease and multivessel coronary artery calcifications and Scattered bilateral centrilobular tiny nodules/nodular clusters suggesting bronchiolitis or aspiration.  Follow-up suggested in 3 months, pulmonology is managing         Plan:  Discussed and reviewed medical history  Reviewed labs- Mild Anemia- hemoglobin remains 9.5, Iron Parameters- Iron Serum 23, Iron Saturation 9%, Ferritin 91  Encouraged to continue on oral iron daily, take in the evening (don't take with omeprazole  Ordered course of IV Iron to be infused upon insurance approval  Labs with IV Start at infusion center (Vitamin D, TSH, Folate)  Repeat labs 4 weeks post iron infusions  Return to see APRN 3 months with labs prior     (CBC w/diff, CMP, Ferritin, Iron Panel, Retic, B12)       Rola Luciano, APRN-CNP

## 2025-03-19 DIAGNOSIS — E61.1 IRON DEFICIENCY: ICD-10-CM

## 2025-03-19 DIAGNOSIS — E43 UNSPECIFIED SEVERE PROTEIN-CALORIE MALNUTRITION (MULTI): ICD-10-CM

## 2025-03-19 DIAGNOSIS — D63.8 ANEMIA OF CHRONIC DISEASE: ICD-10-CM

## 2025-03-20 ENCOUNTER — INFUSION (OUTPATIENT)
Dept: HEMATOLOGY/ONCOLOGY | Facility: CLINIC | Age: 80
End: 2025-03-20
Payer: MEDICARE

## 2025-03-20 VITALS
RESPIRATION RATE: 22 BRPM | HEART RATE: 81 BPM | WEIGHT: 111.11 LBS | OXYGEN SATURATION: 99 % | BODY MASS INDEX: 18.49 KG/M2 | SYSTOLIC BLOOD PRESSURE: 105 MMHG | TEMPERATURE: 97.2 F | DIASTOLIC BLOOD PRESSURE: 66 MMHG

## 2025-03-20 DIAGNOSIS — E61.1 IRON DEFICIENCY: ICD-10-CM

## 2025-03-20 DIAGNOSIS — D63.8 ANEMIA OF CHRONIC DISEASE: ICD-10-CM

## 2025-03-20 DIAGNOSIS — E43 UNSPECIFIED SEVERE PROTEIN-CALORIE MALNUTRITION (MULTI): ICD-10-CM

## 2025-03-20 LAB
T4 FREE SERPL-MCNC: 0.97 NG/DL (ref 0.61–1.12)
TSH SERPL-ACNC: 0.28 MIU/L (ref 0.44–3.98)

## 2025-03-20 PROCEDURE — 84439 ASSAY OF FREE THYROXINE: CPT

## 2025-03-20 PROCEDURE — 96365 THER/PROPH/DIAG IV INF INIT: CPT

## 2025-03-20 PROCEDURE — 82746 ASSAY OF FOLIC ACID SERUM: CPT | Mod: SAMLAB

## 2025-03-20 PROCEDURE — 84443 ASSAY THYROID STIM HORMONE: CPT

## 2025-03-20 PROCEDURE — 2500000004 HC RX 250 GENERAL PHARMACY W/ HCPCS (ALT 636 FOR OP/ED)

## 2025-03-20 PROCEDURE — 82306 VITAMIN D 25 HYDROXY: CPT | Mod: SAMLAB

## 2025-03-20 RX ORDER — EPINEPHRINE 0.3 MG/.3ML
0.3 INJECTION SUBCUTANEOUS EVERY 5 MIN PRN
OUTPATIENT
Start: 2025-03-27

## 2025-03-20 RX ORDER — HEPARIN SODIUM,PORCINE/PF 10 UNIT/ML
50 SYRINGE (ML) INTRAVENOUS AS NEEDED
OUTPATIENT
Start: 2025-03-20

## 2025-03-20 RX ORDER — DIPHENHYDRAMINE HYDROCHLORIDE 50 MG/ML
50 INJECTION, SOLUTION INTRAMUSCULAR; INTRAVENOUS AS NEEDED
OUTPATIENT
Start: 2025-03-27

## 2025-03-20 RX ORDER — FAMOTIDINE 10 MG/ML
20 INJECTION, SOLUTION INTRAVENOUS ONCE AS NEEDED
OUTPATIENT
Start: 2025-03-27

## 2025-03-20 RX ORDER — ALBUTEROL SULFATE 0.83 MG/ML
3 SOLUTION RESPIRATORY (INHALATION) AS NEEDED
OUTPATIENT
Start: 2025-03-27

## 2025-03-20 RX ORDER — HEPARIN 100 UNIT/ML
500 SYRINGE INTRAVENOUS AS NEEDED
OUTPATIENT
Start: 2025-03-20

## 2025-03-20 RX ADMIN — FERUMOXYTOL 510 MG: 510 INJECTION INTRAVENOUS at 13:47

## 2025-03-20 ASSESSMENT — PAIN SCALES - GENERAL: PAINLEVEL_OUTOF10: 0-NO PAIN

## 2025-03-21 LAB
25(OH)D3 SERPL-MCNC: 16 NG/ML (ref 30–100)
FOLATE SERPL-MCNC: 7.2 NG/ML

## 2025-03-25 ENCOUNTER — OFFICE VISIT (OUTPATIENT)
Dept: PAIN MEDICINE | Facility: CLINIC | Age: 80
End: 2025-03-25
Payer: MEDICARE

## 2025-03-25 VITALS
RESPIRATION RATE: 20 BRPM | SYSTOLIC BLOOD PRESSURE: 109 MMHG | BODY MASS INDEX: 18.3 KG/M2 | HEART RATE: 94 BPM | WEIGHT: 110 LBS | DIASTOLIC BLOOD PRESSURE: 73 MMHG

## 2025-03-25 DIAGNOSIS — M54.12 CERVICAL RADICULITIS: ICD-10-CM

## 2025-03-25 DIAGNOSIS — M47.816 LUMBAR FACET ARTHROPATHY: ICD-10-CM

## 2025-03-25 DIAGNOSIS — G89.4 CHRONIC PAIN DISORDER: ICD-10-CM

## 2025-03-25 DIAGNOSIS — M48.062 NEUROGENIC CLAUDICATION DUE TO LUMBAR SPINAL STENOSIS: Primary | ICD-10-CM

## 2025-03-25 PROCEDURE — 99214 OFFICE O/P EST MOD 30 MIN: CPT

## 2025-03-25 RX ORDER — TRAMADOL HYDROCHLORIDE 50 MG/1
50 TABLET ORAL 3 TIMES DAILY PRN
Qty: 90 TABLET | Refills: 0 | Status: SHIPPED | OUTPATIENT
Start: 2025-03-25

## 2025-03-25 ASSESSMENT — ENCOUNTER SYMPTOMS
ENDOCRINE NEGATIVE: 1
EYES NEGATIVE: 1
BRUISES/BLEEDS EASILY: 0
LIGHT-HEADEDNESS: 0
SHORTNESS OF BREATH: 0
CONSTITUTIONAL NEGATIVE: 1
BACK PAIN: 1
WHEEZING: 0
WEAKNESS: 0
COUGH: 0
ALLERGIC/IMMUNOLOGIC NEGATIVE: 1
MYALGIAS: 1
DYSPHORIC MOOD: 0
ADENOPATHY: 0
FACIAL ASYMMETRY: 0
ARTHRALGIAS: 0
PALPITATIONS: 0

## 2025-03-25 NOTE — PROGRESS NOTES
"Subjective   Patient ID: Olayinka Melara is a 79 y.o. male who presents for Pain (FUV for Left L3/4 and L4/5 TFESI done 2-7-25 for L low back pain with radiation down L leg to knee. Patient states he did get \"a little relief.\" Pain score is 6/10 today. Pain is described as a constant dull pain. Normal activity causes pain. Tramadol will help relieve the pain. Refill needed for Tramadol. ). THELMA = 49/100. ETOH screen negative. Patient is ambulating with a rolater walker.   Saundra Cueto RN 03/25/25 9:44 AM     The patient is a 79-year-old male who presents today for a follow-up after undergoing a left side L3-4 and L4-5 transforaminal epidural steroid injection on 2/7/2025.  The patient reports mild relief from this procedure with around 20% pain relief that is ongoing.  He says it just took the edge off of his symptoms, but did not provide any overwhelming relief.  He currently rates his pain a 6/10 across his lower back and down his left leg to his knee, with intermittent pain into his right leg to his knee.  We briefly discussed the possibility of pursuing MBB/RFA, but at this time he would like to hold off on any further injections.  He is currently on tramadol 50 mg 3 times a day as needed.  He denies side effects to this medication, and says it helps take the edge off his pain and improve his level of functioning.  Risks and benefits of opioid therapy were discussed with the patient, naloxone was offered and declined, PDMP reviewed, no evidence of aberrant drug-seeking behaviors.  He is requesting a refill of his tramadol.        Review of Systems   Constitutional: Negative.    HENT: Negative.     Eyes: Negative.    Respiratory:  Negative for cough, shortness of breath and wheezing.    Cardiovascular:  Negative for chest pain, palpitations and leg swelling.   Endocrine: Negative.    Genitourinary: Negative.    Musculoskeletal:  Positive for back pain and myalgias. Negative for arthralgias.   Skin: Negative.  "   Allergic/Immunologic: Negative.    Neurological:  Negative for facial asymmetry, weakness and light-headedness.   Hematological:  Negative for adenopathy. Does not bruise/bleed easily.   Psychiatric/Behavioral:  Negative for dysphoric mood and suicidal ideas.        Objective   Physical Exam  Constitutional:       General: He is not in acute distress.     Appearance: Normal appearance.   HENT:      Head: Normocephalic.      Mouth/Throat:      Mouth: Mucous membranes are moist.   Eyes:      Extraocular Movements: Extraocular movements intact.   Cardiovascular:      Rate and Rhythm: Normal rate and regular rhythm.      Pulses: Normal pulses.      Heart sounds: Normal heart sounds. No murmur heard.     No friction rub. No gallop.   Pulmonary:      Effort: Pulmonary effort is normal.      Breath sounds: Normal breath sounds. No wheezing, rhonchi or rales.      Comments: On nasal cannula  Abdominal:      General: Abdomen is flat.      Palpations: Abdomen is soft.   Musculoskeletal:      Cervical back: Normal range of motion.      Right lower leg: No edema.      Left lower leg: No edema.      Comments: Ambulates with a rollator  Strength 5/5 BLE   Lymphadenopathy:      Cervical: No cervical adenopathy.   Skin:     General: Skin is warm and dry.   Neurological:      General: No focal deficit present.      Mental Status: He is alert and oriented to person, place, and time. Mental status is at baseline.   Psychiatric:         Mood and Affect: Mood normal.         Behavior: Behavior normal.         Assessment/Plan   Diagnoses and all orders for this visit:  Neurogenic claudication due to lumbar spinal stenosis  -     traMADol (Ultram) 50 mg tablet; Take 1 tablet (50 mg) by mouth 3 times a day as needed for moderate pain (4 - 6).  Lumbar facet arthropathy  -     Drug Screen, Urine With Reflex to Confirmation  Cervical radiculitis  -     traMADol (Ultram) 50 mg tablet; Take 1 tablet (50 mg) by mouth 3 times a day as needed  for moderate pain (4 - 6).  Chronic pain disorder  -     traMADol (Ultram) 50 mg tablet; Take 1 tablet (50 mg) by mouth 3 times a day as needed for moderate pain (4 - 6).       The patient is a 79-year-old male with a past medical history significant for the above-mentioned problems.  He is following up after lumbar TFESI with mild ongoing relief.  He is requesting refill of his tramadol.  We will update his UDS today, and send in a refill of his tramadol.  He will follow-up in 3 months, call the clinic sooner if needed.

## 2025-03-26 LAB
AMPHETAMINES UR QL: NEGATIVE NG/ML
BARBITURATES UR QL: NEGATIVE NG/ML
BENZODIAZ UR QL: NEGATIVE NG/ML
BZE UR QL: NEGATIVE NG/ML
CREAT UR-MCNC: 98.7 MG/DL
METHADONE UR QL: NEGATIVE NG/ML
OPIATES UR QL: NEGATIVE NG/ML
OXIDANTS UR QL: NEGATIVE MCG/ML
OXYCODONE UR QL: NEGATIVE NG/ML
PCP UR QL: NEGATIVE NG/ML
PH UR: 7 [PH] (ref 4.5–9)
QUEST NOTES AND COMMENTS: NORMAL
THC UR QL: NEGATIVE NG/ML

## 2025-03-27 ENCOUNTER — INFUSION (OUTPATIENT)
Dept: HEMATOLOGY/ONCOLOGY | Facility: CLINIC | Age: 80
End: 2025-03-27
Payer: MEDICARE

## 2025-03-27 VITALS
WEIGHT: 110.45 LBS | DIASTOLIC BLOOD PRESSURE: 71 MMHG | HEART RATE: 67 BPM | RESPIRATION RATE: 20 BRPM | TEMPERATURE: 97.2 F | SYSTOLIC BLOOD PRESSURE: 126 MMHG | BODY MASS INDEX: 18.38 KG/M2 | OXYGEN SATURATION: 99 %

## 2025-03-27 DIAGNOSIS — E61.1 IRON DEFICIENCY: ICD-10-CM

## 2025-03-27 DIAGNOSIS — D63.8 ANEMIA OF CHRONIC DISEASE: ICD-10-CM

## 2025-03-27 PROCEDURE — 2500000004 HC RX 250 GENERAL PHARMACY W/ HCPCS (ALT 636 FOR OP/ED): Mod: JZ

## 2025-03-27 PROCEDURE — 96365 THER/PROPH/DIAG IV INF INIT: CPT

## 2025-03-27 RX ORDER — EPINEPHRINE 0.3 MG/.3ML
0.3 INJECTION SUBCUTANEOUS EVERY 5 MIN PRN
OUTPATIENT
Start: 2025-03-27

## 2025-03-27 RX ORDER — FAMOTIDINE 10 MG/ML
20 INJECTION, SOLUTION INTRAVENOUS ONCE AS NEEDED
OUTPATIENT
Start: 2025-03-27

## 2025-03-27 RX ORDER — HEPARIN SODIUM,PORCINE/PF 10 UNIT/ML
50 SYRINGE (ML) INTRAVENOUS AS NEEDED
OUTPATIENT
Start: 2025-03-27

## 2025-03-27 RX ORDER — HEPARIN 100 UNIT/ML
500 SYRINGE INTRAVENOUS AS NEEDED
OUTPATIENT
Start: 2025-03-27

## 2025-03-27 RX ORDER — ALBUTEROL SULFATE 0.83 MG/ML
3 SOLUTION RESPIRATORY (INHALATION) AS NEEDED
OUTPATIENT
Start: 2025-03-27

## 2025-03-27 RX ORDER — DIPHENHYDRAMINE HYDROCHLORIDE 50 MG/ML
50 INJECTION, SOLUTION INTRAMUSCULAR; INTRAVENOUS AS NEEDED
OUTPATIENT
Start: 2025-03-27

## 2025-03-27 RX ADMIN — FERUMOXYTOL 510 MG: 510 INJECTION INTRAVENOUS at 13:39

## 2025-03-27 ASSESSMENT — PAIN SCALES - GENERAL: PAINLEVEL_OUTOF10: 0-NO PAIN

## 2025-04-28 ENCOUNTER — TELEPHONE (OUTPATIENT)
Dept: PAIN MEDICINE | Facility: CLINIC | Age: 80
End: 2025-04-28
Payer: MEDICARE

## 2025-04-28 DIAGNOSIS — M48.062 NEUROGENIC CLAUDICATION DUE TO LUMBAR SPINAL STENOSIS: ICD-10-CM

## 2025-04-28 DIAGNOSIS — G89.4 CHRONIC PAIN DISORDER: ICD-10-CM

## 2025-04-28 DIAGNOSIS — M54.12 CERVICAL RADICULITIS: ICD-10-CM

## 2025-04-28 RX ORDER — TRAMADOL HYDROCHLORIDE 50 MG/1
50 TABLET ORAL 3 TIMES DAILY PRN
Qty: 90 TABLET | Refills: 0 | Status: SHIPPED | OUTPATIENT
Start: 2025-04-28

## 2025-04-29 ENCOUNTER — APPOINTMENT (OUTPATIENT)
Age: 80
End: 2025-04-29
Payer: MEDICARE

## 2025-04-29 VITALS
SYSTOLIC BLOOD PRESSURE: 138 MMHG | HEART RATE: 68 BPM | WEIGHT: 111.6 LBS | OXYGEN SATURATION: 97 % | DIASTOLIC BLOOD PRESSURE: 72 MMHG | BODY MASS INDEX: 18.59 KG/M2 | HEIGHT: 65 IN

## 2025-04-29 DIAGNOSIS — J41.8 MIXED SIMPLE AND MUCOPURULENT CHRONIC BRONCHITIS (MULTI): ICD-10-CM

## 2025-04-29 DIAGNOSIS — Z00.00 ROUTINE GENERAL MEDICAL EXAMINATION AT HEALTH CARE FACILITY: Primary | ICD-10-CM

## 2025-04-29 PROCEDURE — 1036F TOBACCO NON-USER: CPT | Performed by: FAMILY MEDICINE

## 2025-04-29 PROCEDURE — G0439 PPPS, SUBSEQ VISIT: HCPCS | Performed by: FAMILY MEDICINE

## 2025-04-29 PROCEDURE — 1157F ADVNC CARE PLAN IN RCRD: CPT | Performed by: FAMILY MEDICINE

## 2025-04-29 PROCEDURE — 1159F MED LIST DOCD IN RCRD: CPT | Performed by: FAMILY MEDICINE

## 2025-04-29 PROCEDURE — 1170F FXNL STATUS ASSESSED: CPT | Performed by: FAMILY MEDICINE

## 2025-04-29 PROCEDURE — 1160F RVW MEDS BY RX/DR IN RCRD: CPT | Performed by: FAMILY MEDICINE

## 2025-04-29 PROCEDURE — 99213 OFFICE O/P EST LOW 20 MIN: CPT | Performed by: FAMILY MEDICINE

## 2025-04-29 RX ORDER — AZITHROMYCIN 250 MG/1
TABLET, FILM COATED ORAL
Qty: 6 TABLET | Refills: 0 | Status: SHIPPED | OUTPATIENT
Start: 2025-04-29 | End: 2025-05-04

## 2025-04-29 RX ORDER — PREDNISONE 10 MG/1
TABLET ORAL
Qty: 30 TABLET | Refills: 0 | Status: SHIPPED | OUTPATIENT
Start: 2025-04-29 | End: 2025-05-11

## 2025-04-29 ASSESSMENT — ACTIVITIES OF DAILY LIVING (ADL)
DOING_HOUSEWORK: NEEDS ASSISTANCE
TAKING_MEDICATION: INDEPENDENT
MANAGING_FINANCES: NEEDS ASSISTANCE
BATHING: INDEPENDENT
GROCERY_SHOPPING: NEEDS ASSISTANCE
DRESSING: INDEPENDENT

## 2025-04-29 ASSESSMENT — ENCOUNTER SYMPTOMS
OCCASIONAL FEELINGS OF UNSTEADINESS: 1
DEPRESSION: 0
LIGHT-HEADEDNESS: 0
PALPITATIONS: 0
CONSTIPATION: 0
WEAKNESS: 1
DIARRHEA: 0
FATIGUE: 1
COUGH: 1
WHEEZING: 1
CHILLS: 0
SHORTNESS OF BREATH: 1
NAUSEA: 0
FEVER: 0
LOSS OF SENSATION IN FEET: 0

## 2025-04-29 NOTE — PROGRESS NOTES
"Subjective   Reason for Visit: Olayinka Melara is an 79 y.o. male here for a Medicare Wellness visit.     Past Medical, Surgical, and Family History reviewed and updated in chart.    Reviewed all medications by prescribing practitioner or clinical pharmacist (such as prescriptions, OTCs, herbal therapies and supplements) and documented in the medical record.    HPI  Up-to-date on immunizations.  [facility]  Follows with pulmonology for his lungs.  He has like he is get a little more wheezing and coughing up more stuff over the last week or so.  Will go ahead and refill the antibiotic and prednisone taper.  Follows for the blood counts with hematology.  Urine flow is stable on the medication      Patient Care Team:  Amrit Aaron MD as PCP - General  KIARA Zamora as PCP - [employer] PCP     Review of Systems   Constitutional:  Positive for fatigue. Negative for chills and fever.   Respiratory:  Positive for cough, shortness of breath and wheezing.    Cardiovascular:  Negative for chest pain and palpitations.   Gastrointestinal:  Negative for constipation, diarrhea and nausea.   Skin:  Negative for rash.   Neurological:  Positive for weakness. Negative for light-headedness.       Objective   Vitals:  /72   Pulse 68   Ht 1.651 m (5' 5\")   Wt 50.6 kg (111 lb 9.6 oz)   SpO2 97% Comment: 3lpm  BMI 18.57 kg/m²       Physical Exam  Constitutional:       Appearance: Normal appearance.   HENT:      Head: Normocephalic and atraumatic.   Cardiovascular:      Rate and Rhythm: Normal rate and regular rhythm.      Heart sounds: Normal heart sounds.   Pulmonary:      Effort: Pulmonary effort is normal.      Breath sounds: Decreased air movement present. Rhonchi present. No wheezing or rales.   Skin:     General: Skin is warm and dry.   Neurological:      General: No focal deficit present.      Mental Status: He is alert and oriented to " person, place, and time.   Psychiatric:         Mood and Affect: Mood normal.         Behavior: Behavior normal.         Thought Content: Thought content normal.         Judgment: Judgment normal.         Assessment & Plan  Routine general medical examination at health care facility    Orders:    1 Year Follow Up In Primary Care - Wellness Exam; Future    Mixed simple and mucopurulent chronic bronchitis (Multi)    Orders:    azithromycin (Zithromax) 250 mg tablet; Take 2 tablets (500 mg) by mouth once daily for 1 day, THEN 1 tablet (250 mg) once daily for 4 days. Take 2 tabs (500 mg) by mouth today, than 1 daily for 4 days.    predniSONE (Deltasone) 10 mg tablet; Take 4 tablets (40 mg) by mouth once daily for 3 days, THEN 3 tablets (30 mg) once daily for 3 days, THEN 2 tablets (20 mg) once daily for 3 days, THEN 1 tablet (10 mg) once daily for 3 days.

## 2025-04-29 NOTE — ASSESSMENT & PLAN NOTE
Orders:    azithromycin (Zithromax) 250 mg tablet; Take 2 tablets (500 mg) by mouth once daily for 1 day, THEN 1 tablet (250 mg) once daily for 4 days. Take 2 tabs (500 mg) by mouth today, than 1 daily for 4 days.    predniSONE (Deltasone) 10 mg tablet; Take 4 tablets (40 mg) by mouth once daily for 3 days, THEN 3 tablets (30 mg) once daily for 3 days, THEN 2 tablets (20 mg) once daily for 3 days, THEN 1 tablet (10 mg) once daily for 3 days.

## 2025-05-23 ENCOUNTER — APPOINTMENT (OUTPATIENT)
Dept: HEMATOLOGY/ONCOLOGY | Facility: CLINIC | Age: 80
End: 2025-05-23
Payer: MEDICARE

## 2025-05-27 ENCOUNTER — TELEPHONE (OUTPATIENT)
Dept: PAIN MEDICINE | Facility: CLINIC | Age: 80
End: 2025-05-27
Payer: MEDICARE

## 2025-05-27 DIAGNOSIS — M48.062 NEUROGENIC CLAUDICATION DUE TO LUMBAR SPINAL STENOSIS: ICD-10-CM

## 2025-05-27 DIAGNOSIS — M54.12 CERVICAL RADICULITIS: ICD-10-CM

## 2025-05-27 DIAGNOSIS — G89.4 CHRONIC PAIN DISORDER: ICD-10-CM

## 2025-05-27 RX ORDER — TRAMADOL HYDROCHLORIDE 50 MG/1
50 TABLET, FILM COATED ORAL 3 TIMES DAILY PRN
Qty: 90 TABLET | Refills: 0 | Status: SHIPPED | OUTPATIENT
Start: 2025-05-27

## 2025-05-31 ENCOUNTER — LAB (OUTPATIENT)
Dept: LAB | Facility: HOSPITAL | Age: 80
End: 2025-05-31
Payer: MEDICARE

## 2025-05-31 LAB
ALBUMIN SERPL BCP-MCNC: 3.8 G/DL (ref 3.4–5)
ALP SERPL-CCNC: 76 U/L (ref 33–136)
ALT SERPL W P-5'-P-CCNC: 5 U/L (ref 10–52)
ANION GAP SERPL CALC-SCNC: 10 MMOL/L (ref 10–20)
AST SERPL W P-5'-P-CCNC: 10 U/L (ref 9–39)
BASOPHILS # BLD AUTO: 0.04 X10*3/UL (ref 0–0.1)
BASOPHILS NFR BLD AUTO: 1 %
BILIRUB SERPL-MCNC: 1 MG/DL (ref 0–1.2)
BUN SERPL-MCNC: 14 MG/DL (ref 6–23)
CALCIUM SERPL-MCNC: 8.8 MG/DL (ref 8.6–10.3)
CHLORIDE SERPL-SCNC: 96 MMOL/L (ref 98–107)
CO2 SERPL-SCNC: 32 MMOL/L (ref 21–32)
CREAT SERPL-MCNC: 0.57 MG/DL (ref 0.5–1.3)
EGFRCR SERPLBLD CKD-EPI 2021: >90 ML/MIN/1.73M*2
EOSINOPHIL # BLD AUTO: 0.06 X10*3/UL (ref 0–0.4)
EOSINOPHIL NFR BLD AUTO: 1.5 %
ERYTHROCYTE [DISTWIDTH] IN BLOOD BY AUTOMATED COUNT: 17 % (ref 11.5–14.5)
FERRITIN SERPL-MCNC: 195 NG/ML (ref 20–300)
GLUCOSE SERPL-MCNC: 84 MG/DL (ref 74–99)
HCT VFR BLD AUTO: 32.5 % (ref 41–52)
HGB BLD-MCNC: 10.5 G/DL (ref 13.5–17.5)
HGB RETIC QN: 34 PG (ref 28–38)
IMM GRANULOCYTES # BLD AUTO: 0.03 X10*3/UL (ref 0–0.5)
IMM GRANULOCYTES NFR BLD AUTO: 0.8 % (ref 0–0.9)
IMMATURE RETIC FRACTION: 12.7 %
IRON SATN MFR SERPL: 29 % (ref 25–45)
IRON SERPL-MCNC: 74 UG/DL (ref 35–150)
LYMPHOCYTES # BLD AUTO: 0.93 X10*3/UL (ref 0.8–3)
LYMPHOCYTES NFR BLD AUTO: 23.5 %
MCH RBC QN AUTO: 29.1 PG (ref 26–34)
MCHC RBC AUTO-ENTMCNC: 32.3 G/DL (ref 32–36)
MCV RBC AUTO: 90 FL (ref 80–100)
MONOCYTES # BLD AUTO: 0.36 X10*3/UL (ref 0.05–0.8)
MONOCYTES NFR BLD AUTO: 9.1 %
NEUTROPHILS # BLD AUTO: 2.54 X10*3/UL (ref 1.6–5.5)
NEUTROPHILS NFR BLD AUTO: 64.1 %
NRBC BLD-RTO: 0 /100 WBCS (ref 0–0)
PLATELET # BLD AUTO: 329 X10*3/UL (ref 150–450)
POTASSIUM SERPL-SCNC: 3.9 MMOL/L (ref 3.5–5.3)
PROT SERPL-MCNC: 6.2 G/DL (ref 6.4–8.2)
RBC # BLD AUTO: 3.61 X10*6/UL (ref 4.5–5.9)
RETICS #: 0.1 X10*6/UL (ref 0.02–0.11)
RETICS/RBC NFR AUTO: 2.7 % (ref 0.5–2)
SODIUM SERPL-SCNC: 134 MMOL/L (ref 136–145)
TIBC SERPL-MCNC: 259 UG/DL (ref 240–445)
UIBC SERPL-MCNC: 185 UG/DL (ref 110–370)
VIT B12 SERPL-MCNC: 775 PG/ML (ref 211–911)
WBC # BLD AUTO: 4 X10*3/UL (ref 4.4–11.3)

## 2025-05-31 PROCEDURE — 83550 IRON BINDING TEST: CPT

## 2025-05-31 PROCEDURE — 85025 COMPLETE CBC W/AUTO DIFF WBC: CPT

## 2025-05-31 PROCEDURE — 85045 AUTOMATED RETICULOCYTE COUNT: CPT

## 2025-05-31 PROCEDURE — 80053 COMPREHEN METABOLIC PANEL: CPT

## 2025-05-31 PROCEDURE — 82728 ASSAY OF FERRITIN: CPT

## 2025-05-31 PROCEDURE — 36415 COLL VENOUS BLD VENIPUNCTURE: CPT

## 2025-05-31 PROCEDURE — 83540 ASSAY OF IRON: CPT

## 2025-05-31 PROCEDURE — 82607 VITAMIN B-12: CPT

## 2025-06-02 DIAGNOSIS — E43 UNSPECIFIED SEVERE PROTEIN-CALORIE MALNUTRITION (MULTI): ICD-10-CM

## 2025-06-02 DIAGNOSIS — E61.1 IRON DEFICIENCY: ICD-10-CM

## 2025-06-02 DIAGNOSIS — D63.8 ANEMIA OF CHRONIC DISEASE: ICD-10-CM

## 2025-06-05 ENCOUNTER — OFFICE VISIT (OUTPATIENT)
Dept: HEMATOLOGY/ONCOLOGY | Facility: CLINIC | Age: 80
End: 2025-06-05
Payer: MEDICARE

## 2025-06-05 VITALS
TEMPERATURE: 97.3 F | OXYGEN SATURATION: 97 % | BODY MASS INDEX: 17.91 KG/M2 | HEART RATE: 62 BPM | DIASTOLIC BLOOD PRESSURE: 57 MMHG | WEIGHT: 107.6 LBS | SYSTOLIC BLOOD PRESSURE: 105 MMHG

## 2025-06-05 DIAGNOSIS — E61.1 IRON DEFICIENCY: ICD-10-CM

## 2025-06-05 DIAGNOSIS — D63.8 ANEMIA OF CHRONIC DISEASE: ICD-10-CM

## 2025-06-05 DIAGNOSIS — E43 UNSPECIFIED SEVERE PROTEIN-CALORIE MALNUTRITION (MULTI): ICD-10-CM

## 2025-06-05 PROCEDURE — 1159F MED LIST DOCD IN RCRD: CPT

## 2025-06-05 PROCEDURE — 99213 OFFICE O/P EST LOW 20 MIN: CPT

## 2025-06-05 PROCEDURE — 1126F AMNT PAIN NOTED NONE PRSNT: CPT

## 2025-06-05 ASSESSMENT — PAIN SCALES - GENERAL: PAINLEVEL_OUTOF10: 0-NO PAIN

## 2025-06-05 NOTE — PATIENT INSTRUCTIONS
Discussed and reviewed medical history   Reviewed recent labs- Iron parameters have improved- Iron Saturation 29%, Ferritin 76,   Hemoglobin improved to 10.5  Vitamin D level is low at 16, Discussed starting oral Vitamin D3 1000 mg daily  Encouraged to continue on oral Iron daily with source of vitamin C take 2 hours prior or 4 hours after prilosec (omeprazole)  Return to see APRN in 3 months with labs prior    (CBC w/diff, CMP, Ferritin, Iron Panel, B12, Vitamin D, Retic, SPEP, Immunoglobulin, FLC)

## 2025-06-05 NOTE — PROGRESS NOTES
Pt instructed to get labs at the hosp the week prior to appt  Rtc 9/4 at 930 for MD visit  Reviewed AVS with patient- patient verbalizes understanding

## 2025-06-05 NOTE — PROGRESS NOTES
Patient ID: Olayinka Melara is a 79 y.o. male.    Subjective    HPI  Expand All Collapse All[]Expand All by Default    Patient ID: Olayinka Melara is a 79 y.o. male.     Subjective  HPI  HPI     Chief Complaint: Evaluation for anemia   Interval History:    PCP : Dr. Aaron     Reason for referral: Anemia     HPI  75 year old man with pmh of BPH, COPD, kyphoscoliosis, Lumbosacral spondylosis, referred for anemia. He has chronic anemia and progressively dropping Hg 11-12 g/dl last year to 8.6 g/dl in Sep 2021 until recently he had a visit to ER on 10/22/21 for SoB where his Hg was 7.0 g/dl. MCV is 64, his ferritin was 10 , b12 and ferritin were normal last year. He was transfused 1 unit PRBC and told to follow hematology  He has been feeling more tired recently , he has chronic fatigue that worsened near the time he went to the emergency room   He has chronic SOB and DUARTE from COPD and actually was having some hemoptysis with blood tinged sputum that day . This has improved   He has occasionally seeing melena around once per month   He has chronic  epigastric pain and occasional nausea   He is not active and does not have energy to do much activities   Weight fluctuating but mostly same range of 112-118   He is no urinary complaints   No changes in hair or nails      EGD on 1/21/22 showed erosive gastropathy with stigmata of bleeding, biopsies showed healing ulcers , no malignancy      interval history - 6/5/25     Wife remains at [facility]     Energy remains on the lower side, remains fatigued  Falls asleep multiple times during the day watching TV  Denies any fevers or recent infections     SOB depends on the weather recently, increased to O2 3L NC  Pulse Ox 96%, drops to 80% when walking really fast      Eating and drinking okay   Bruises easily   No abnormal bleeding, no nose,gum bleeds, hematuria, hematochezia      Single episode of epitaxies, cleared up with saline nasal spray   No chest pain or pressure   No  fever, chills, or infections   Occasional night sweats 2 times weekly     Chronic Productive cough, clear thick or green phlegm      No abdominal pain /cramping   No diarrhea or constipation   Frequent urination, no dysuria or hematuria     Occasional numbness or tingling in hands or feet  No new concerns     Objective  BSA: There is no height or weight on file to calculate BSA.  There were no vitals taken for this visit.          Objective    BSA: 1.5 meters squared  /57   Pulse 62   Temp 36.3 °C (97.3 °F)   Wt 48.8 kg (107 lb 9.6 oz)   SpO2 97% Comment: Oxygen at 3L NC  BMI 17.91 kg/m²      Physical Exam    Performance Status:  Symptomatic; in bed <50% of the day      Assessment/Plan   1. Anemia   secondary to iron deficiency on a likely AOCD  s/p one unit PRBC transfusion in ER in Oct 2021      have received 2 course of IV iron   improved Hg now to 11.4 g/dl and sufficient iron stores      remaining labs normal/ negative      EGD showed evidence of upper GI bleed with erosive gastropathy/ ulcer      3/14/25: Mildly Anemic with a Hg of 9.5. Iron parameters remain low, Iron Serum 23, Iron Saturation 9%, Ferritin 91. B12 is stable at 773. Regardless will continue to monitor.  Patient is taking oral iron daily.  Discussed likely secondary to chronic disease, inflammatory processes and malabsorption. Recommendation is ordering a course of IV Iron to be administered upon insurance approval, in hopes to replace his iron. Continue oral iron and B12 daily. Will continue to monitor labs for efficacy of iron replacement.           2. COPD Severe (HCC)  -managed by Pulmonology  -dependent on supplemental oxygen 2-3L/min   Recent CT scan on 7/3/24 reported-diffuse bilateral bronchitis with scattered peripheral airway mucoid impactions, volume loss of the left upper lobe with left upper lobe/lingular bronchiectasis, background changes of significant emphysema, dilatation of the aortic root at 4.0 cm.   Atherosclerotic vascular disease and multivessel coronary artery calcifications and Scattered bilateral centrilobular tiny nodules/nodular clusters suggesting bronchiolitis or aspiration.  Follow-up suggested in 3 months, pulmonology is managing         Plan:  Discussed and reviewed medical history  Reviewed labs- Mild Anemia- hemoglobin remains 9.5, Iron Parameters- Iron Serum 23, Iron Saturation 9%, Ferritin 91  Encouraged to continue on oral iron daily, take in the evening (don't take with omeprazole  Ordered course of IV Iron to be infused upon insurance approval  Labs with IV Start at infusion center (Vitamin D, TSH, Folate)  Repeat labs 4 weeks post iron infusions  Return to see APRN 3 months with labs prior      (CBC w/diff, CMP, Ferritin, Iron Panel, Retic, B12)         Rola Luciano, APRN-CNP            continue on oral Iron daily with source of vitamin C take 2 hours prior or 4 hours after prilosec (omeprazole)  Return to see APRN in 3 months with labs prior    (CBC w/diff, CMP, Ferritin, Iron Panel, B12, Vitamin D, Retic, SPEP, Immunoglobulin, FLC)         Rola Luciano, APRN-CNP

## 2025-06-23 ENCOUNTER — OFFICE VISIT (OUTPATIENT)
Dept: PAIN MEDICINE | Facility: CLINIC | Age: 80
End: 2025-06-23
Payer: MEDICARE

## 2025-06-23 VITALS
HEART RATE: 64 BPM | SYSTOLIC BLOOD PRESSURE: 122 MMHG | WEIGHT: 112 LBS | DIASTOLIC BLOOD PRESSURE: 72 MMHG | RESPIRATION RATE: 20 BRPM | BODY MASS INDEX: 18.64 KG/M2

## 2025-06-23 DIAGNOSIS — M48.062 NEUROGENIC CLAUDICATION DUE TO LUMBAR SPINAL STENOSIS: ICD-10-CM

## 2025-06-23 DIAGNOSIS — M54.12 CERVICAL RADICULITIS: ICD-10-CM

## 2025-06-23 DIAGNOSIS — G89.4 CHRONIC PAIN DISORDER: ICD-10-CM

## 2025-06-23 PROCEDURE — 99214 OFFICE O/P EST MOD 30 MIN: CPT

## 2025-06-23 RX ORDER — TRAMADOL HYDROCHLORIDE 50 MG/1
50 TABLET, FILM COATED ORAL 3 TIMES DAILY PRN
Qty: 90 TABLET | Refills: 0 | Status: SHIPPED | OUTPATIENT
Start: 2025-06-23

## 2025-06-23 RX ORDER — FERROUS SULFATE 325(65) MG
325 TABLET, DELAYED RELEASE (ENTERIC COATED) ORAL
COMMUNITY

## 2025-06-23 RX ORDER — CHOLECALCIFEROL (VITAMIN D3) 25 MCG
25 TABLET ORAL DAILY
COMMUNITY

## 2025-06-23 ASSESSMENT — ENCOUNTER SYMPTOMS
MYALGIAS: 1
ADENOPATHY: 0
FACIAL ASYMMETRY: 0
LIGHT-HEADEDNESS: 0
CONSTITUTIONAL NEGATIVE: 1
WHEEZING: 0
PALPITATIONS: 0
BACK PAIN: 1
DYSPHORIC MOOD: 0
ENDOCRINE NEGATIVE: 1
ALLERGIC/IMMUNOLOGIC NEGATIVE: 1
ARTHRALGIAS: 0
COUGH: 0
WEAKNESS: 0
EYES NEGATIVE: 1
SHORTNESS OF BREATH: 0
BRUISES/BLEEDS EASILY: 0

## 2025-06-23 NOTE — PROGRESS NOTES
Subjective   Patient ID: Olayinka Melara is a 79 y.o. male who presents for Med Management (FOLLOW UP ON TRAMADOL, HE STATES HE IS STABLE ON CURRENT DOSE HE IS TAKING IT DAILY , HE IS DUE FOR A REFILL, HE CONTINUES TO GET RELIEF FROM HIS LOWER BACK PAIN AND RIGHT LEG PAIN THAT GOES TO HIS KNEE, HE HAS THE SHARP PAIN WITH STANDING AND WALKING, HE WILL SIT FOR RELIEF BUT IF HE SITS TOO LONG HE HAS DISCOMFORT SO HE STAND TO WALK FOR RELIEF,  HE NOTES HE IS STILL GETTING RELIEF FROM HIS LAST TFESI 2/7/25). HE AMBULATES WITH A SEATED ROLATOR, HE IS ON OXYGEN, PAIN SCORE 6/10, THELMA=36%  Halle Donaldson, CMA 06/23/25 8:59 AM     The patient is a 79-year-old male presents today for 3-month follow-up appointment for medication management.  He currently rates his pain a 6/10 across his lower back and intermittently will get sharp pains with activity such as standing and walking.  We currently have him on tramadol 50 mg 3 times a day as needed. The patient is stable and reports well controlled pain on the current regimen. The patient is able to perform activities of daily living on this medication regimen. The patient reports tolerable side effects from this medication. No evidence of aberrant drug seeking behaviors. Here today for periodic review of therapy and refill of medication.  He last had a left-sided L3-4 and L4-5 TFESI on 2/7/2025 that originally only gave 20% relief, but he says over time the relief increased to about 50%, and this lasted for 3 months before the pain began to return.  However, he does not think it is time to repeat this injection yet, but would want to in the future due to the relief he gave him.        Review of Systems   Constitutional: Negative.    HENT: Negative.     Eyes: Negative.    Respiratory:  Negative for cough, shortness of breath and wheezing.    Cardiovascular:  Negative for chest pain, palpitations and leg swelling.   Endocrine: Negative.    Genitourinary: Negative.    Musculoskeletal:   Positive for back pain and myalgias. Negative for arthralgias.   Skin: Negative.    Allergic/Immunologic: Negative.    Neurological:  Negative for facial asymmetry, weakness and light-headedness.   Hematological:  Negative for adenopathy. Does not bruise/bleed easily.   Psychiatric/Behavioral:  Negative for dysphoric mood and suicidal ideas.        Objective   Physical Exam  Constitutional:       General: He is not in acute distress.     Appearance: Normal appearance.   HENT:      Head: Normocephalic.      Mouth/Throat:      Mouth: Mucous membranes are moist.   Eyes:      Extraocular Movements: Extraocular movements intact.   Cardiovascular:      Rate and Rhythm: Normal rate and regular rhythm.      Pulses: Normal pulses.      Heart sounds: Normal heart sounds. No murmur heard.     No friction rub. No gallop.   Pulmonary:      Effort: Pulmonary effort is normal.      Breath sounds: Normal breath sounds. No wheezing, rhonchi or rales.      Comments: On nasal cannula  Abdominal:      General: Abdomen is flat.      Palpations: Abdomen is soft.   Musculoskeletal:      Cervical back: Normal range of motion.      Right lower leg: No edema.      Left lower leg: No edema.      Comments: Ambulates with a rollator  Strength 5/5 BLE     Lymphadenopathy:      Cervical: No cervical adenopathy.   Skin:     General: Skin is warm and dry.   Neurological:      General: No focal deficit present.      Mental Status: He is alert and oriented to person, place, and time. Mental status is at baseline.   Psychiatric:         Mood and Affect: Mood normal.         Behavior: Behavior normal.         Assessment/Plan   Diagnoses and all orders for this visit:  Neurogenic claudication due to lumbar spinal stenosis  -     traMADol (Ultram) 50 mg tablet; Take 1 tablet (50 mg) by mouth 3 times a day as needed for moderate pain (4 - 6).  Cervical radiculitis  -     traMADol (Ultram) 50 mg tablet; Take 1 tablet (50 mg) by mouth 3 times a day as  needed for moderate pain (4 - 6).  Chronic pain disorder  -     traMADol (Ultram) 50 mg tablet; Take 1 tablet (50 mg) by mouth 3 times a day as needed for moderate pain (4 - 6).       The patient is a 79-year-old male with past medical history significant for the above-mentioned problems.  He is doing well on his current medication regimen, and is requesting a refill of his tramadol.  He does not think it is time to repeat his TFESI yet, but says may be at his next visit. Patient is on chronic opioid therapy. Patient OARRS/PDMP was reviewed, pain treatment agreement was up-to-date, urine drug screen is up-to-date, risks and benefits of opioid therapy were discussed with the patient, and Naloxone was offered to the patient within the past year. The current opioid therapy allows the patient to continue to perform activities of daily living. Patient will be monitored closely for any sign of misuse or diversion.  He denies any further questions or concerns about his pain at this time.  He will follow-up with us in 3 months, call the clinic sooner if needed.

## 2025-06-24 ENCOUNTER — APPOINTMENT (OUTPATIENT)
Dept: PAIN MEDICINE | Facility: CLINIC | Age: 80
End: 2025-06-24
Payer: MEDICARE

## 2025-07-28 ENCOUNTER — TELEPHONE (OUTPATIENT)
Dept: PAIN MEDICINE | Facility: CLINIC | Age: 80
End: 2025-07-28
Payer: MEDICARE

## 2025-07-28 DIAGNOSIS — M54.12 CERVICAL RADICULITIS: ICD-10-CM

## 2025-07-28 DIAGNOSIS — G89.4 CHRONIC PAIN DISORDER: ICD-10-CM

## 2025-07-28 DIAGNOSIS — M48.062 NEUROGENIC CLAUDICATION DUE TO LUMBAR SPINAL STENOSIS: ICD-10-CM

## 2025-07-28 RX ORDER — TRAMADOL HYDROCHLORIDE 50 MG/1
50 TABLET, FILM COATED ORAL 3 TIMES DAILY PRN
Qty: 90 TABLET | Refills: 0 | Status: SHIPPED | OUTPATIENT
Start: 2025-07-28

## 2025-08-25 ENCOUNTER — TELEPHONE (OUTPATIENT)
Dept: PAIN MEDICINE | Facility: CLINIC | Age: 80
End: 2025-08-25
Payer: MEDICARE

## 2025-08-25 DIAGNOSIS — M54.12 CERVICAL RADICULITIS: ICD-10-CM

## 2025-08-25 DIAGNOSIS — G89.4 CHRONIC PAIN DISORDER: ICD-10-CM

## 2025-08-25 DIAGNOSIS — M48.062 NEUROGENIC CLAUDICATION DUE TO LUMBAR SPINAL STENOSIS: ICD-10-CM

## 2025-08-25 DIAGNOSIS — E61.1 IRON DEFICIENCY: ICD-10-CM

## 2025-08-25 DIAGNOSIS — D63.8 ANEMIA OF CHRONIC DISEASE: ICD-10-CM

## 2025-08-25 DIAGNOSIS — E43 UNSPECIFIED SEVERE PROTEIN-CALORIE MALNUTRITION (MULTI): ICD-10-CM

## 2025-08-26 RX ORDER — TRAMADOL HYDROCHLORIDE 50 MG/1
50 TABLET, FILM COATED ORAL 3 TIMES DAILY PRN
Qty: 90 TABLET | Refills: 0 | Status: SHIPPED | OUTPATIENT
Start: 2025-08-26

## 2025-08-29 LAB
ALBUMIN SERPL BCP-MCNC: 3.6 G/DL (ref 3.4–5)
ALP SERPL-CCNC: 66 U/L (ref 33–136)
ALT SERPL W P-5'-P-CCNC: 4 U/L (ref 10–52)
ANION GAP SERPL CALC-SCNC: 10 MMOL/L (ref 10–20)
AST SERPL W P-5'-P-CCNC: 9 U/L (ref 9–39)
BASOPHILS # BLD AUTO: 0.02 X10*3/UL (ref 0–0.1)
BASOPHILS NFR BLD AUTO: 0.7 %
BILIRUB SERPL-MCNC: 0.9 MG/DL (ref 0–1.2)
BUN SERPL-MCNC: 12 MG/DL (ref 6–23)
CALCIUM SERPL-MCNC: 8.8 MG/DL (ref 8.6–10.3)
CHLORIDE SERPL-SCNC: 99 MMOL/L (ref 98–107)
CO2 SERPL-SCNC: 31 MMOL/L (ref 21–32)
CREAT SERPL-MCNC: 0.58 MG/DL (ref 0.5–1.3)
EGFRCR SERPLBLD CKD-EPI 2021: >90 ML/MIN/1.73M*2
EOSINOPHIL # BLD AUTO: 0.06 X10*3/UL (ref 0–0.4)
EOSINOPHIL NFR BLD AUTO: 2.1 %
ERYTHROCYTE [DISTWIDTH] IN BLOOD BY AUTOMATED COUNT: 17 % (ref 11.5–14.5)
FERRITIN SERPL-MCNC: 32 NG/ML (ref 20–300)
GLUCOSE SERPL-MCNC: 81 MG/DL (ref 74–99)
HCT VFR BLD AUTO: 27.7 % (ref 41–52)
HGB BLD-MCNC: 8.7 G/DL (ref 13.5–17.5)
HGB RETIC QN: 31 PG (ref 28–38)
IMM GRANULOCYTES # BLD AUTO: 0.04 X10*3/UL (ref 0–0.5)
IMM GRANULOCYTES NFR BLD AUTO: 1.4 % (ref 0–0.9)
IMMATURE RETIC FRACTION: 15.2 %
IRON SATN MFR SERPL: 8 % (ref 25–45)
IRON SERPL-MCNC: 24 UG/DL (ref 35–150)
LYMPHOCYTES # BLD AUTO: 0.71 X10*3/UL (ref 0.8–3)
LYMPHOCYTES NFR BLD AUTO: 25.3 %
MCH RBC QN AUTO: 27.9 PG (ref 26–34)
MCHC RBC AUTO-ENTMCNC: 31.4 G/DL (ref 32–36)
MCV RBC AUTO: 89 FL (ref 80–100)
MONOCYTES # BLD AUTO: 0.35 X10*3/UL (ref 0.05–0.8)
MONOCYTES NFR BLD AUTO: 12.5 %
NEUTROPHILS # BLD AUTO: 1.63 X10*3/UL (ref 1.6–5.5)
NEUTROPHILS NFR BLD AUTO: 58 %
NRBC BLD-RTO: 0 /100 WBCS (ref 0–0)
PLATELET # BLD AUTO: 279 X10*3/UL (ref 150–450)
POTASSIUM SERPL-SCNC: 4 MMOL/L (ref 3.5–5.3)
PROT SERPL-MCNC: 5.8 G/DL (ref 6.4–8.2)
RBC # BLD AUTO: 3.12 X10*6/UL (ref 4.5–5.9)
RETICS #: 0.09 X10*6/UL (ref 0.02–0.11)
RETICS/RBC NFR AUTO: 2.7 % (ref 0.5–2)
SODIUM SERPL-SCNC: 136 MMOL/L (ref 136–145)
TIBC SERPL-MCNC: 293 UG/DL (ref 240–445)
UIBC SERPL-MCNC: 269 UG/DL (ref 110–370)
WBC # BLD AUTO: 2.8 X10*3/UL (ref 4.4–11.3)

## 2025-08-29 PROCEDURE — 82306 VITAMIN D 25 HYDROXY: CPT

## 2025-08-29 PROCEDURE — 82728 ASSAY OF FERRITIN: CPT

## 2025-08-29 PROCEDURE — 80053 COMPREHEN METABOLIC PANEL: CPT

## 2025-08-29 PROCEDURE — 85025 COMPLETE CBC W/AUTO DIFF WBC: CPT

## 2025-08-29 PROCEDURE — 83540 ASSAY OF IRON: CPT

## 2025-08-29 PROCEDURE — 83550 IRON BINDING TEST: CPT

## 2025-08-29 PROCEDURE — 85045 AUTOMATED RETICULOCYTE COUNT: CPT

## 2025-08-30 LAB
25(OH)D3 SERPL-MCNC: 28 NG/ML (ref 30–100)
IGA SERPL-MCNC: 259 MG/DL (ref 70–400)
IGG SERPL-MCNC: 764 MG/DL (ref 700–1600)
IGM SERPL-MCNC: 13 MG/DL (ref 40–230)
PROT SERPL-MCNC: 5.8 G/DL (ref 6.4–8.2)
VIT B12 SERPL-MCNC: 815 PG/ML (ref 211–911)

## 2025-08-31 LAB
KAPPA LC SERPL-MCNC: 2.66 MG/DL (ref 0.33–1.94)
KAPPA LC/LAMBDA SER: 1.09 {RATIO} (ref 0.26–1.65)
LAMBDA LC SERPL-MCNC: 2.45 MG/DL (ref 0.57–2.63)

## 2025-09-03 LAB
ALBUMIN: 3.3 G/DL (ref 3.4–5)
ALPHA 1 GLOBULIN: 0.3 G/DL (ref 0.2–0.6)
ALPHA 2 GLOBULIN: 0.7 G/DL (ref 0.4–1.1)
BETA GLOBULIN: 0.7 G/DL (ref 0.5–1.2)
GAMMA GLOBULIN: 0.7 G/DL (ref 0.5–1.4)
PATH REVIEW-SERUM PROTEIN ELECTROPHORESIS: ABNORMAL
PROTEIN ELECTROPHORESIS COMMENT: ABNORMAL

## 2025-09-04 ENCOUNTER — OFFICE VISIT (OUTPATIENT)
Dept: HEMATOLOGY/ONCOLOGY | Facility: CLINIC | Age: 80
End: 2025-09-04
Payer: MEDICARE

## 2025-09-04 VITALS
RESPIRATION RATE: 20 BRPM | BODY MASS INDEX: 18.8 KG/M2 | HEART RATE: 79 BPM | DIASTOLIC BLOOD PRESSURE: 77 MMHG | WEIGHT: 113 LBS | OXYGEN SATURATION: 96 % | SYSTOLIC BLOOD PRESSURE: 113 MMHG | TEMPERATURE: 97.7 F

## 2025-09-04 DIAGNOSIS — E43 UNSPECIFIED SEVERE PROTEIN-CALORIE MALNUTRITION (MULTI): ICD-10-CM

## 2025-09-04 DIAGNOSIS — D63.8 ANEMIA OF CHRONIC DISEASE: ICD-10-CM

## 2025-09-04 DIAGNOSIS — E61.1 IRON DEFICIENCY: Primary | ICD-10-CM

## 2025-09-04 PROCEDURE — 99214 OFFICE O/P EST MOD 30 MIN: CPT

## 2025-09-04 PROCEDURE — 1126F AMNT PAIN NOTED NONE PRSNT: CPT

## 2025-09-04 PROCEDURE — 1159F MED LIST DOCD IN RCRD: CPT

## 2025-09-04 RX ORDER — ALBUTEROL SULFATE 0.83 MG/ML
3 SOLUTION RESPIRATORY (INHALATION) AS NEEDED
OUTPATIENT
Start: 2025-09-04

## 2025-09-04 RX ORDER — EPINEPHRINE 0.3 MG/.3ML
0.3 INJECTION SUBCUTANEOUS EVERY 5 MIN PRN
OUTPATIENT
Start: 2025-09-04

## 2025-09-04 RX ORDER — DIPHENHYDRAMINE HYDROCHLORIDE 50 MG/ML
50 INJECTION, SOLUTION INTRAMUSCULAR; INTRAVENOUS AS NEEDED
OUTPATIENT
Start: 2025-09-04

## 2025-09-04 RX ORDER — FAMOTIDINE 10 MG/ML
20 INJECTION, SOLUTION INTRAVENOUS ONCE AS NEEDED
OUTPATIENT
Start: 2025-09-04

## 2025-09-04 ASSESSMENT — PAIN SCALES - GENERAL: PAINLEVEL_OUTOF10: 0-NO PAIN

## 2026-05-01 ENCOUNTER — APPOINTMENT (OUTPATIENT)
Age: 81
End: 2026-05-01
Payer: MEDICARE